# Patient Record
Sex: MALE | Race: BLACK OR AFRICAN AMERICAN | NOT HISPANIC OR LATINO | Employment: OTHER | URBAN - METROPOLITAN AREA
[De-identification: names, ages, dates, MRNs, and addresses within clinical notes are randomized per-mention and may not be internally consistent; named-entity substitution may affect disease eponyms.]

---

## 2023-08-06 ENCOUNTER — HOSPITAL ENCOUNTER (OUTPATIENT)
Facility: HOSPITAL | Age: 73
Discharge: HOME | End: 2023-08-07
Attending: EMERGENCY MEDICINE | Admitting: INTERNAL MEDICINE
Payer: MEDICARE

## 2023-08-06 DIAGNOSIS — I26.99 ACUTE PULMONARY EMBOLISM WITHOUT ACUTE COR PULMONALE, UNSPECIFIED PULMONARY EMBOLISM TYPE: Primary | ICD-10-CM

## 2023-08-06 DIAGNOSIS — R07.9 CHEST PAIN: ICD-10-CM

## 2023-08-06 PROBLEM — U07.1 COVID-19 VIRUS INFECTION: Status: ACTIVE | Noted: 2023-08-06

## 2023-08-06 LAB
ALBUMIN SERPL BCP-MCNC: 3.5 G/DL (ref 3.5–5.2)
ALP SERPL-CCNC: 35 U/L (ref 55–135)
ALT SERPL W/O P-5'-P-CCNC: 26 U/L (ref 10–44)
ANION GAP SERPL CALC-SCNC: 11 MMOL/L (ref 8–16)
AST SERPL-CCNC: 23 U/L (ref 10–40)
BASOPHILS # BLD AUTO: 0.02 K/UL (ref 0–0.2)
BASOPHILS NFR BLD: 0.5 % (ref 0–1.9)
BILIRUB SERPL-MCNC: 1 MG/DL (ref 0.1–1)
BNP SERPL-MCNC: 6 PG/ML (ref 0–99)
BUN SERPL-MCNC: <5 MG/DL (ref 8–23)
CALCIUM SERPL-MCNC: 8.6 MG/DL (ref 8.7–10.5)
CHLORIDE SERPL-SCNC: 100 MMOL/L (ref 95–110)
CO2 SERPL-SCNC: 24 MMOL/L (ref 23–29)
CREAT SERPL-MCNC: 0.8 MG/DL (ref 0.5–1.4)
D DIMER PPP IA.FEU-MCNC: 2.05 MG/L FEU
DIFFERENTIAL METHOD: ABNORMAL
EOSINOPHIL # BLD AUTO: 0.1 K/UL (ref 0–0.5)
EOSINOPHIL NFR BLD: 2.1 % (ref 0–8)
ERYTHROCYTE [DISTWIDTH] IN BLOOD BY AUTOMATED COUNT: 11.9 % (ref 11.5–14.5)
EST. GFR  (NO RACE VARIABLE): >60 ML/MIN/1.73 M^2
GLUCOSE SERPL-MCNC: 105 MG/DL (ref 70–110)
HCT VFR BLD AUTO: 35 % (ref 40–54)
HGB BLD-MCNC: 12.4 G/DL (ref 14–18)
IMM GRANULOCYTES # BLD AUTO: 0.02 K/UL (ref 0–0.04)
IMM GRANULOCYTES NFR BLD AUTO: 0.5 % (ref 0–0.5)
LYMPHOCYTES # BLD AUTO: 1.1 K/UL (ref 1–4.8)
LYMPHOCYTES NFR BLD: 28 % (ref 18–48)
MAGNESIUM SERPL-MCNC: 1.6 MG/DL (ref 1.6–2.6)
MCH RBC QN AUTO: 30 PG (ref 27–31)
MCHC RBC AUTO-ENTMCNC: 35.4 G/DL (ref 32–36)
MCV RBC AUTO: 85 FL (ref 82–98)
MONOCYTES # BLD AUTO: 0.4 K/UL (ref 0.3–1)
MONOCYTES NFR BLD: 9.5 % (ref 4–15)
NEUTROPHILS # BLD AUTO: 2.3 K/UL (ref 1.8–7.7)
NEUTROPHILS NFR BLD: 59.4 % (ref 38–73)
NRBC BLD-RTO: 0 /100 WBC
PLATELET # BLD AUTO: 321 K/UL (ref 150–450)
PMV BLD AUTO: 10.3 FL (ref 9.2–12.9)
POTASSIUM SERPL-SCNC: 3.3 MMOL/L (ref 3.5–5.1)
PROT SERPL-MCNC: 6.6 G/DL (ref 6–8.4)
RBC # BLD AUTO: 4.13 M/UL (ref 4.6–6.2)
SARS-COV-2 RDRP RESP QL NAA+PROBE: POSITIVE
SODIUM SERPL-SCNC: 135 MMOL/L (ref 136–145)
TROPONIN I SERPL HS-MCNC: 14.1 PG/ML (ref 0–14.9)
TROPONIN I SERPL HS-MCNC: 14.4 PG/ML (ref 0–14.9)
WBC # BLD AUTO: 3.89 K/UL (ref 3.9–12.7)

## 2023-08-06 PROCEDURE — G0378 HOSPITAL OBSERVATION PER HR: HCPCS

## 2023-08-06 PROCEDURE — 93005 ELECTROCARDIOGRAM TRACING: CPT | Performed by: GENERAL PRACTICE

## 2023-08-06 PROCEDURE — 85379 FIBRIN DEGRADATION QUANT: CPT | Performed by: EMERGENCY MEDICINE

## 2023-08-06 PROCEDURE — 83735 ASSAY OF MAGNESIUM: CPT | Performed by: EMERGENCY MEDICINE

## 2023-08-06 PROCEDURE — 83880 ASSAY OF NATRIURETIC PEPTIDE: CPT | Performed by: EMERGENCY MEDICINE

## 2023-08-06 PROCEDURE — 63600175 PHARM REV CODE 636 W HCPCS: Performed by: INTERNAL MEDICINE

## 2023-08-06 PROCEDURE — 96372 THER/PROPH/DIAG INJ SC/IM: CPT | Performed by: INTERNAL MEDICINE

## 2023-08-06 PROCEDURE — 93010 EKG 12-LEAD: ICD-10-PCS | Mod: ,,, | Performed by: GENERAL PRACTICE

## 2023-08-06 PROCEDURE — 25000003 PHARM REV CODE 250: Performed by: EMERGENCY MEDICINE

## 2023-08-06 PROCEDURE — 93010 ELECTROCARDIOGRAM REPORT: CPT | Mod: ,,, | Performed by: GENERAL PRACTICE

## 2023-08-06 PROCEDURE — 85025 COMPLETE CBC W/AUTO DIFF WBC: CPT | Performed by: EMERGENCY MEDICINE

## 2023-08-06 PROCEDURE — 99291 CRITICAL CARE FIRST HOUR: CPT | Mod: 25

## 2023-08-06 PROCEDURE — 80053 COMPREHEN METABOLIC PANEL: CPT | Performed by: EMERGENCY MEDICINE

## 2023-08-06 PROCEDURE — U0002 COVID-19 LAB TEST NON-CDC: HCPCS | Performed by: INTERNAL MEDICINE

## 2023-08-06 PROCEDURE — 84484 ASSAY OF TROPONIN QUANT: CPT | Mod: 91 | Performed by: EMERGENCY MEDICINE

## 2023-08-06 PROCEDURE — 63600175 PHARM REV CODE 636 W HCPCS: Performed by: EMERGENCY MEDICINE

## 2023-08-06 PROCEDURE — 25000003 PHARM REV CODE 250: Performed by: INTERNAL MEDICINE

## 2023-08-06 PROCEDURE — 25500020 PHARM REV CODE 255: Performed by: EMERGENCY MEDICINE

## 2023-08-06 PROCEDURE — 96372 THER/PROPH/DIAG INJ SC/IM: CPT | Performed by: EMERGENCY MEDICINE

## 2023-08-06 RX ORDER — LISINOPRIL AND HYDROCHLOROTHIAZIDE 20; 25 MG/1; MG/1
1 TABLET ORAL DAILY
COMMUNITY

## 2023-08-06 RX ORDER — CALCIUM CARBONATE 600 MG
TABLET ORAL ONCE
COMMUNITY

## 2023-08-06 RX ORDER — CETIRIZINE HYDROCHLORIDE 10 MG/1
10 TABLET ORAL DAILY
COMMUNITY

## 2023-08-06 RX ORDER — ATORVASTATIN CALCIUM 40 MG/1
40 TABLET, FILM COATED ORAL DAILY
Status: DISCONTINUED | OUTPATIENT
Start: 2023-08-06 | End: 2023-08-07

## 2023-08-06 RX ORDER — MULTIVITAMIN
1 TABLET ORAL DAILY
COMMUNITY

## 2023-08-06 RX ORDER — NITROGLYCERIN 0.4 MG/1
0.4 TABLET SUBLINGUAL EVERY 5 MIN PRN
Status: DISCONTINUED | OUTPATIENT
Start: 2023-08-06 | End: 2023-08-07 | Stop reason: HOSPADM

## 2023-08-06 RX ORDER — ATORVASTATIN CALCIUM 40 MG/1
40 TABLET, FILM COATED ORAL DAILY
COMMUNITY

## 2023-08-06 RX ORDER — NIFEDIPINE 30 MG/1
30 TABLET, EXTENDED RELEASE ORAL DAILY
Status: DISCONTINUED | OUTPATIENT
Start: 2023-08-06 | End: 2023-08-07

## 2023-08-06 RX ORDER — ACETAMINOPHEN 325 MG/1
650 TABLET ORAL EVERY 4 HOURS PRN
Status: DISCONTINUED | OUTPATIENT
Start: 2023-08-06 | End: 2023-08-07 | Stop reason: HOSPADM

## 2023-08-06 RX ORDER — ENOXAPARIN SODIUM 100 MG/ML
1 INJECTION SUBCUTANEOUS
Status: DISCONTINUED | OUTPATIENT
Start: 2023-08-06 | End: 2023-08-07 | Stop reason: HOSPADM

## 2023-08-06 RX ORDER — NIFEDIPINE 30 MG/1
30 TABLET, FILM COATED, EXTENDED RELEASE ORAL DAILY
COMMUNITY

## 2023-08-06 RX ORDER — LEVOTHYROXINE SODIUM 25 UG/1
25 TABLET ORAL
COMMUNITY

## 2023-08-06 RX ORDER — PREDNISONE 20 MG/1
20 TABLET ORAL DAILY
COMMUNITY

## 2023-08-06 RX ORDER — ENOXAPARIN SODIUM 100 MG/ML
1 INJECTION SUBCUTANEOUS
Status: COMPLETED | OUTPATIENT
Start: 2023-08-06 | End: 2023-08-06

## 2023-08-06 RX ORDER — POTASSIUM CHLORIDE 20 MEQ/1
40 TABLET, EXTENDED RELEASE ORAL ONCE
Status: COMPLETED | OUTPATIENT
Start: 2023-08-06 | End: 2023-08-06

## 2023-08-06 RX ADMIN — ATORVASTATIN CALCIUM 40 MG: 40 TABLET, FILM COATED ORAL at 09:08

## 2023-08-06 RX ADMIN — ENOXAPARIN SODIUM 80 MG: 80 INJECTION SUBCUTANEOUS at 12:08

## 2023-08-06 RX ADMIN — ENOXAPARIN SODIUM 80 MG: 80 INJECTION SUBCUTANEOUS at 09:08

## 2023-08-06 RX ADMIN — NITROGLYCERIN 1 INCH: 20 OINTMENT TOPICAL at 09:08

## 2023-08-06 RX ADMIN — IOHEXOL 100 ML: 350 INJECTION, SOLUTION INTRAVENOUS at 11:08

## 2023-08-06 RX ADMIN — POTASSIUM CHLORIDE 40 MEQ: 1500 TABLET, EXTENDED RELEASE ORAL at 12:08

## 2023-08-06 RX ADMIN — NIFEDIPINE 30 MG: 30 TABLET, FILM COATED, EXTENDED RELEASE ORAL at 09:08

## 2023-08-06 NOTE — PLAN OF CARE
08/06/23 1554   KRAUS Message   Medicare Outpatient and Observation Notification regarding financial responsibility Given to patient/caregiver;Explained to patient/caregiver;Signed/date by patient/caregiver   Date KRAUS was signed 08/06/23   Time KRAUS was signed 1534

## 2023-08-06 NOTE — ED PROVIDER NOTES
Encounter Date: 8/6/2023       History     Chief Complaint   Patient presents with    Chest Pain     Described as heaviness, 1 Nitro SL and 324 Asprin per EMS PTA.      73-year-old male with past medical history of hypertension, recent COVID diagnosis 1 week ago, presents emergency department with chest pain.  He says it started around 7:00 a.m..  Described as a heaviness in his left chest.  Says that he also had some tingling in his left hand.  He says that he took 324 mg of aspirin prior to calling EMS.  EMS roving gave him 1 sublingual nitro which mostly relieved his pain.  Currently he says that he is still having some pain around 5/10, some mild tingling in his left hand.  He says that it did not make him sweaty.  He did not vomit.  He did have some associated shortness of breath earlier today.  He has not had any recent stress test.  He is currently on antibiotics and he says he was scheduled to start steroids today.  Has not taken any steroids yet.      Review of patient's allergies indicates:  No Known Allergies  No past medical history on file.  No past surgical history on file.  No family history on file.     Review of Systems   Constitutional:  Negative for chills and fever.   HENT:  Negative for congestion and sore throat.    Respiratory:  Negative for shortness of breath.    Cardiovascular:  Positive for chest pain.   Gastrointestinal:  Negative for abdominal pain, nausea and vomiting.   Genitourinary:  Negative for dysuria.   Musculoskeletal:  Negative for back pain.   Skin:  Negative for rash.   Neurological:  Negative for weakness and headaches.   Hematological:  Does not bruise/bleed easily.   All other systems reviewed and are negative.      Physical Exam     Initial Vitals [08/06/23 0913]   BP Pulse Resp Temp SpO2   114/63 67 18 98.3 °F (36.8 °C) 99 %      MAP       --         Physical Exam    Nursing note and vitals reviewed.  Constitutional: He appears well-developed and well-nourished. He is  not diaphoretic. No distress.   HENT:   Head: Normocephalic and atraumatic.   Mouth/Throat: Oropharynx is clear and moist. No oropharyngeal exudate.   Eyes: Conjunctivae and EOM are normal. Pupils are equal, round, and reactive to light.   Neck: Neck supple. No tracheal deviation present.   Cardiovascular:  Normal rate, regular rhythm, normal heart sounds and intact distal pulses.           No murmur heard.  Pulmonary/Chest: Breath sounds normal. No stridor. No respiratory distress. He has no wheezes. He has no rhonchi. He has no rales.   Abdominal: Abdomen is soft. Bowel sounds are normal. He exhibits no distension. There is no abdominal tenderness. There is no rebound and no guarding.   Musculoskeletal:         General: No tenderness or edema. Normal range of motion.      Cervical back: Neck supple.     Neurological: He is alert and oriented to person, place, and time. He has normal strength. No cranial nerve deficit or sensory deficit. GCS score is 15. GCS eye subscore is 4. GCS verbal subscore is 5. GCS motor subscore is 6.   Skin: Skin is warm and dry. Capillary refill takes less than 2 seconds. No rash noted. No erythema. No pallor.   Psychiatric: He has a normal mood and affect. His behavior is normal. Thought content normal.         ED Course   Procedures  Labs Reviewed   CBC W/ AUTO DIFFERENTIAL - Abnormal; Notable for the following components:       Result Value    WBC 3.89 (*)     RBC 4.13 (*)     Hemoglobin 12.4 (*)     Hematocrit 35.0 (*)     All other components within normal limits   COMPREHENSIVE METABOLIC PANEL - Abnormal; Notable for the following components:    Sodium 135 (*)     Potassium 3.3 (*)     BUN <5 (*)     Calcium 8.6 (*)     Alkaline Phosphatase 35 (*)     All other components within normal limits   D DIMER, QUANTITATIVE - Abnormal; Notable for the following components:    D-Dimer 2.05 (*)     All other components within normal limits    Narrative:     ddimr critical result(s) repeated.  Called and verbal readback   obtained from Pam Epstein RN by LS1 08/06/2023 10:21   MAGNESIUM   TROPONIN I HIGH SENSITIVITY   B-TYPE NATRIURETIC PEPTIDE   TROPONIN I HIGH SENSITIVITY        ECG Results              EKG 12-lead (In process)  Result time 08/06/23 12:22:16      In process by Interface, Lab In [facility] (08/06/23 12:22:16)                   Narrative:    Test Reason : R07.9,    Vent. Rate : 062 BPM     Atrial Rate : 062 BPM     P-R Int : 142 ms          QRS Dur : 082 ms      QT Int : 412 ms       P-R-T Axes : 045 034 010 degrees     QTc Int : 418 ms    Normal sinus rhythm  Cannot rule out Anterior infarct ,age undetermined  Abnormal ECG  No previous ECGs available    Referred By: [email or web address]   SELF           Confirmed By:                                   Results for orders placed or performed during the hospital encounter of 08/06/23   Magnesium   Result Value Ref Range    Magnesium 1.6 1.6 - 2.6 mg/dL   CBC auto differential   Result Value Ref Range    WBC 3.89 (L) 3.90 - 12.70 K/uL    RBC 4.13 (L) 4.60 - 6.20 M/uL    Hemoglobin 12.4 (L) 14.0 - 18.0 g/dL    Hematocrit 35.0 (L) 40.0 - 54.0 %    MCV 85 82 - 98 fL    MCH 30.0 27.0 - 31.0 pg    MCHC 35.4 32.0 - 36.0 g/dL    RDW 11.9 11.5 - 14.5 %    Platelets 321 150 - 450 K/uL    MPV 10.3 9.2 - 12.9 fL    Immature Granulocytes 0.5 0.0 - 0.5 %    Gran # (ANC) 2.3 1.8 - 7.7 K/uL    Immature Grans (Abs) 0.02 0.00 - 0.04 K/uL    Lymph # 1.1 1.0 - 4.8 K/uL    Mono # 0.4 0.3 - 1.0 K/uL    Eos # 0.1 0.0 - 0.5 K/uL    Baso # 0.02 0.00 - 0.20 K/uL    nRBC 0 0 /100 WBC    Gran % 59.4 38.0 - 73.0 %    Lymph % 28.0 18.0 - 48.0 %    Mono % 9.5 4.0 - 15.0 %    Eosinophil % 2.1 0.0 - 8.0 %    Basophil % 0.5 0.0 - 1.9 %    Differential Method Automated    Comprehensive metabolic panel   Result Value Ref Range    Sodium 135 (L) 136 - 145 mmol/L    Potassium 3.3 (L) 3.5 - 5.1 mmol/L    Chloride 100 95 - 110 mmol/L    CO2 24 23 - 29 mmol/L    Glucose 105 70 - 110 mg/dL    BUN  <5 (L) 8 - 23 mg/dL    Creatinine 0.8 0.5 - 1.4 mg/dL    Calcium 8.6 (L) 8.7 - 10.5 mg/dL    Total Protein 6.6 6.0 - 8.4 g/dL    Albumin 3.5 3.5 - 5.2 g/dL    Total Bilirubin 1.0 0.1 - 1.0 mg/dL    Alkaline Phosphatase 35 (L) 55 - 135 U/L    AST 23 10 - 40 U/L    ALT 26 10 - 44 U/L    eGFR >60.0 >60 mL/min/1.73 m^2    Anion Gap 11 8 - 16 mmol/L   Troponin I High Sensitivity #1   Result Value Ref Range    Troponin I High Sensitivity 14.1 0.0 - 14.9 pg/mL   B-Type natriuretic peptide (BNP)   Result Value Ref Range    BNP 6 0 - 99 pg/mL   D dimer, quantitative   Result Value Ref Range    D-Dimer 2.05 (HH) <0.50 mg/L FEU       Imaging Results              CTA Chest Non-Coronary (PE Studies) (Final result)  Result time 08/06/23 11:49:19      Final result by Edgar Rodriguez MD (08/06/23 11:49:19)                   Narrative:    CMS MANDATED QUALITY DATA - CT RADIATION  436    All CT scans at this facility utilize dose modulation, iterative reconstruction, and/or weight based dosing when appropriate to reduce radiation dose to as low as reasonably achievable.    CLINICAL HISTORY:  73 years (1950) Male Pulmonary embolism (PE) suspected, positive D-dimer    TECHNIQUE:  CT CHEST ANGIOGRAPHY WITH IV CONTRAST.  Axial CT examination of the chest with attention to the pulmonary arteries was performed using contiguous axial images from the diaphragms to the lung apices following the intravenous administration of non-ionic contrast material. Images were reviewed using lung, mediastinal, and bone windows. The study was performed with thin sections with subsequent 3-D reformats/MIP/reconstructions in multiple planes.    COMPARISON:  Radiograph from the same day.    FINDINGS:  CTA PE evaluation: This is a moderate quality study for the evaluation of pulmonary embolism. Obtained said there is a moderate volume of acute pulmonary thromboembolus in the right greater than left lung. There is a tiny thrombus in the left upper lobe  (image 123), small thrombus in the right upper lobe (image 122), small thrombus in the right main pulmonary artery (image 158), moderate thrombus in the right lower lobe (image 175, 190, 202), tiny thrombus in the right middle lobe (image 170), and small thrombus in the left lower lobe (image 202). No flattening of the interventricular septum or hepatic aorta. Contrast is seen to suggest right heart strain/failure.    CT Chest:  Visualized neck: Within normal limits.  Lungs: There is dependent atelectasis in the bilateral lower lobes.  The lungs are otherwise clear.  Airway: The trachea and central bronchial tree appear normal.  Pleura: There is no pleural effusion. There is no pneumothorax.  Cardiovascular: The heart is normal in size. There are no findings of right heart failure. The pulmonary artery is normal in size. There is no pericardial effusion. The thoracic aorta is normal in course and caliber.  There are atherosclerotic calcifications of the coronary arteries.  Mediastinum: There is no supraclavicular, axillary, mediastinal, or hilar lymphadenopathy.  Soft tissues: The peripheral soft tissues appear normal.  Musculoskeletal: There are mild degenerative changes of the thoracic spine, with an ACDF in the lower cervical spine.  There are no suspicious osseous lesions.  Esophagus: The esophagus appears grossly normal.  Upper Abdomen: No acute abnormality in the upper abdomen. There are simple cysts in the upper pole of both kidneys.    IMPRESSION:  1. Moderate volume of acute pulmonary thrombus embolus in the right greater than left lung.  2. No finding of right heart strain/failure.  3. Additional and incidental findings as above.                  RESULT NOTIFICATION: These observations were called to the ordering provider LATONYA WATSON at 8/6/2023 11:44 AM CDT, with no response. Dawit TOLENTINO, ER bed 17, acknowledged receipt of the findings by phone, to be communicated to the ordering provider.    CMS MANDATED  QUALITY DATA- INCIDENTAL ABDOMINAL RENAL - 405    INCIDENTAL RENAL LESION:  CONTRAST CT  TSTC, Homogeneous  Likely benign cyst not fully characterized. No further workup.  Inconclusive based on subjective evaluation = Indeterminate  Homogeneous: thin or imperceptible wall, no mural nodule septa or calcification  -10 to 20 HU Likely benign cyst. No further workup.  >20 HU Solid or complicated cystic mass. Indeterminate.  Heterogeneous: Thick or irregular wall, mural nodule, septal or calcification. Indeterminate.    TSTC = too small to characterize = less than twice the slice thickness.    RECOMMENDATION:  No further imaging is recommended as incidental lesions are likely benign.    Recommendations based on:  Management of the Incidental Renal Mass on CT: A White Paper of the ACR ICF  J Am Jimena Radiol 2018;15:264-273. 2017 American College of Radiology      Electronically signed by:  Edgar Rodriguez MD  8/6/2023 11:49 AM CDT Workstation: [id number]                                     X-Ray Chest AP Portable (Final result)  Result time 08/06/23 09:50:48      Final result by Edgar Rodriguez MD (08/06/23 09:50:48)                   Narrative:    CLINICAL HISTORY:  73 years (1950) Male Chest Pain Chest Pain    TECHNIQUE:  Portable AP radiograph the chest. One view.    COMPARISON:  None available.    FINDINGS:  The lungs are clear. Costophrenic angles are seen without effusion. No pneumothorax is identified. The heart is normal in size. The mediastinum is within normal limits. Osseous structures show degenerative changes in the spine. The visualized upper abdomen is unremarkable.    IMPRESSION:  No acute cardiac or pulmonary process.                  .            Electronically signed by:  Edgar Rodriguez MD  8/6/2023 9:50 AM CDT Workstation: [id number]                                     Medications   nitroGLYCERIN SL tablet 0.4 mg (has no administration in time range)   atorvastatin tablet 40 mg (has no  administration in time range)   NIFEdipine 24 hr tablet 30 mg (has no administration in time range)   acetaminophen tablet 650 mg (has no administration in time range)   enoxaparin injection 80 mg (has no administration in time range)   nitroGLYCERIN 2% TD oint ointment 1 inch (1 inch Topical (Top) Given 8/6/23 1352)   iohexoL (OMNIPAQUE 350) injection 100 mL (100 mLs Intravenous Given 8/6/23 1119)   enoxaparin injection 80 mg (80 mg Subcutaneous Given 8/6/23 1232)   potassium chloride SA CR tablet 40 mEq (40 mEq Oral Given 8/6/23 1232)     Medical Decision Making:   Differential Diagnosis:   Includes but not limited to PE, pneumonia, acute coronary syndrome, dissection, pneumothorax,  Clinical Tests:   Lab Tests: Ordered and Reviewed  Radiological Study: Ordered and Reviewed  Medical Tests: Ordered and Reviewed  ED Management:  Emergent evaluation of a 73-year-old male presents emergency department with chest pain and heaviness as described above.  Patient in the emergency department with recent diagnosis of COVID 1 week ago.  PE considered as a possibility, D-dimer sent and was elevated.  Patient had CTA of the chest which demonstrates bilateral pulmonary emboli.  Patient does not have any evidence of any right heart strain.  Patient has been given Lovenox here in the emergency department.  Case was discussed with the hospitalist will admit him to the hospital.  Patient hemodynamically stable, not hypotensive.  Patient be admitted for further care and treatment and evaluation of his chest pain/acute PE.            Attending Attestation:             Attending ED Notes:   Attending Critical Care:   Critical Care Times:   Direct Patient Care (initial evaluation, reassessments, and time considering the case)................................................................10 minutes.   Additional History from reviewing old medical records or taking additional history from the family, EMS, PCP,  etc.......................10 minutes.   Ordering, Reviewing, and Interpreting Diagnostic Studies...............................................................................................................10 minutes.   Documentation..................................................................................................................................................................................5 minutes.   ==============================================================  · Total Critical Care Time - exclusive of procedural time: 35 minutes.  ==============================================================  Critical care was necessary to treat or prevent imminent or life-threatening deterioration of the following conditions:  Pulmonary embolus.   Critical care was time spent personally by me on the following activities: obtaining history from patient or relative, examination of patient, review of x-rays / CT sent with the patient, ordering lab, x-rays, and/or EKG, development of treatment plan with patient or relative, ordering and performing treatments and interventions, interpretation of cardiac measurements and re-evaluation of patient's conition.   Critical Care Condition: potentially life-threatening         ED Course as of 08/06/23 1308   Sun Aug 06, 2023   0947 EKG 9:16 a.m. normal sinus rhythm rate of 62.  No ST elevation or depression.  No STEMI.  EKG interpreted independently by me.   [JR]      ED Course User Index  [JR] Mohit Ahuja DO                 Clinical Impression:   Final diagnoses:  [R07.9] Chest pain  [I26.99] Acute pulmonary embolism without acute cor pulmonale, unspecified pulmonary embolism type (Primary)        ED Disposition Condition    Observation                 Mohit Ahuja DO  08/06/23 1305

## 2023-08-06 NOTE — PHARMACY MED REC
"              .    Admission Medication History     The home medication history was taken by May Godinez.    You may go to "Admission" then "Reconcile Home Medications" tabs to review and/or act upon these items.     The home medication list has been updated by the Pharmacy department.   Please read ALL comments highlighted in yellow.   Please address this information as you see fit.    Feel free to contact us if you have any questions or require assistance.    Medications listed below were obtained from: Patient/family  No current facility-administered medications on file prior to encounter.     Current Outpatient Medications on File Prior to Encounter   Medication Sig Dispense Refill    atorvastatin (LIPITOR) 40 MG tablet Take 40 mg by mouth once daily.      CALCIUM CITRATE ORAL Take 1 tablet by mouth once daily.      cetirizine (ZYRTEC) 10 MG tablet Take 10 mg by mouth once daily.      levothyroxine (SYNTHROID) 25 MCG tablet Take 25 mcg by mouth before breakfast.      lisinopriL-hydrochlorothiazide (PRINZIDE,ZESTORETIC) 20-25 mg Tab Take 1 tablet by mouth once daily.      multivitamin (ONE DAILY MULTIVITAMIN) per tablet Take 1 tablet by mouth once daily.      NIFEdipine (ADALAT CC) 30 MG TbSR Take 30 mg by mouth once daily.      calcium carbonate-vit D3-min (CALCIUM 600 + MINERALS) 600 mg calcium- 200 unit Tab Take by mouth once.      predniSONE (DELTASONE) 20 MG tablet Take 20 mg by mouth once daily.             May Godinez  [id number]        "

## 2023-08-06 NOTE — PLAN OF CARE
[facility]  Initial Discharge Assessment       Primary Care Provider: No, Primary Doctor    Admission Diagnosis: Acute pulmonary embolism without acute cor pulmonale, unspecified pulmonary embolism type [I26.99]    Admission Date: 8/6/2023  Expected Discharge Date: TBD  Met with patient at bedside to complete assessment. Information on face sheet was verified. [facility] No [facility], HD, Coumadin, or DME. Patient's wife will bring him home when he is discarged. No identified needs at this time.    Transition of Care Barriers: None    Payor: MEDICARE / Plan: MEDICARE PART A & B / Product Type: Government /     Extended Emergency Contact Information  Primary Emergency Contact: VincentMargie  Mobile Phone: [phone]  Relation: Spouse    Discharge Plan A: Home with family  Discharge Plan B: Home with family    No Pharmacies Listed    Initial Assessment (most recent)       Adult Discharge Assessment - 08/06/23 1549          Discharge Assessment    Assessment Type Discharge Planning Assessment     Confirmed/corrected address, phone number and insurance Yes     Confirmed Demographics Correct on Facesheet     Source of Information patient     When was your last doctors appointment? --   3-4 months ago. Needs PCP    Does patient/caregiver understand observation status Yes     Communicated NIRMAL with patient/caregiver Date not available/Unable to determine     Reason For Admission acute pulmonary embolism     People in Home spouse     Facility Arrived From: home     Do you expect to return to your current living situation? Yes     Do you have help at home or someone to help you manage your care at home? Yes     Who are your caregiver(s) and their phone number(s)? Margie Kaur/wife [phone]     Prior to hospitilization cognitive status: Alert/Oriented;No Deficits     Current cognitive status: Alert/Oriented;No Deficits     Equipment Currently Used at Home none     Readmission within 30 days? No      Patient currently being followed by outpatient case management? No     Do you currently have service(s) that help you manage your care at home? No     Do you take prescription medications? Yes     Do you have prescription coverage? Yes     Coverage [employer]     Do you have any problems affording any of your prescribed medications? No     Who is going to help you get home at discharge? Margie Kaur/wife [phone]     How do you get to doctors appointments? car, drives self     Are you on dialysis? No     Do you take coumadin? No     Discharge Plan A Home with family     Discharge Plan B Home with family     DME Needed Upon Discharge  none     Discharge Plan discussed with: Patient     Transition of Care Barriers None        OTHER    Name(s) of People in Home Margie Kaur/wife [phone]

## 2023-08-07 VITALS
DIASTOLIC BLOOD PRESSURE: 62 MMHG | BODY MASS INDEX: 23.77 KG/M2 | HEART RATE: 64 BPM | RESPIRATION RATE: 18 BRPM | HEIGHT: 72 IN | SYSTOLIC BLOOD PRESSURE: 114 MMHG | TEMPERATURE: 98 F | OXYGEN SATURATION: 98 % | WEIGHT: 175.5 LBS

## 2023-08-07 DIAGNOSIS — U07.1 COVID-19 VIRUS DETECTED: ICD-10-CM

## 2023-08-07 LAB
ALBUMIN SERPL BCP-MCNC: 3.7 G/DL (ref 3.5–5.2)
ALP SERPL-CCNC: 39 U/L (ref 55–135)
ALT SERPL W/O P-5'-P-CCNC: 25 U/L (ref 10–44)
ANION GAP SERPL CALC-SCNC: 7 MMOL/L (ref 8–16)
AST SERPL-CCNC: 17 U/L (ref 10–40)
BILIRUB SERPL-MCNC: 0.7 MG/DL (ref 0.1–1)
BUN SERPL-MCNC: 5 MG/DL (ref 8–23)
CALCIUM SERPL-MCNC: 8.9 MG/DL (ref 8.7–10.5)
CHLORIDE SERPL-SCNC: 104 MMOL/L (ref 95–110)
CO2 SERPL-SCNC: 27 MMOL/L (ref 23–29)
CREAT SERPL-MCNC: 0.8 MG/DL (ref 0.5–1.4)
ERYTHROCYTE [DISTWIDTH] IN BLOOD BY AUTOMATED COUNT: 12 % (ref 11.5–14.5)
EST. GFR  (NO RACE VARIABLE): >60 ML/MIN/1.73 M^2
GLUCOSE SERPL-MCNC: 107 MG/DL (ref 70–110)
HCT VFR BLD AUTO: 36.1 % (ref 40–54)
HGB BLD-MCNC: 12.6 G/DL (ref 14–18)
MCH RBC QN AUTO: 30.1 PG (ref 27–31)
MCHC RBC AUTO-ENTMCNC: 34.9 G/DL (ref 32–36)
MCV RBC AUTO: 86 FL (ref 82–98)
PLATELET # BLD AUTO: 347 K/UL (ref 150–450)
PMV BLD AUTO: 10.2 FL (ref 9.2–12.9)
POTASSIUM SERPL-SCNC: 3.8 MMOL/L (ref 3.5–5.1)
PROT SERPL-MCNC: 6.4 G/DL (ref 6–8.4)
RBC # BLD AUTO: 4.18 M/UL (ref 4.6–6.2)
SODIUM SERPL-SCNC: 138 MMOL/L (ref 136–145)
WBC # BLD AUTO: 4.68 K/UL (ref 3.9–12.7)

## 2023-08-07 PROCEDURE — 85027 COMPLETE CBC AUTOMATED: CPT | Performed by: INTERNAL MEDICINE

## 2023-08-07 PROCEDURE — 36415 COLL VENOUS BLD VENIPUNCTURE: CPT | Performed by: INTERNAL MEDICINE

## 2023-08-07 PROCEDURE — 96372 THER/PROPH/DIAG INJ SC/IM: CPT | Performed by: INTERNAL MEDICINE

## 2023-08-07 PROCEDURE — 80053 COMPREHEN METABOLIC PANEL: CPT | Performed by: INTERNAL MEDICINE

## 2023-08-07 PROCEDURE — 63600175 PHARM REV CODE 636 W HCPCS: Performed by: INTERNAL MEDICINE

## 2023-08-07 PROCEDURE — G0378 HOSPITAL OBSERVATION PER HR: HCPCS

## 2023-08-07 RX ORDER — ATORVASTATIN CALCIUM 40 MG/1
40 TABLET, FILM COATED ORAL NIGHTLY
Status: DISCONTINUED | OUTPATIENT
Start: 2023-08-07 | End: 2023-08-07 | Stop reason: HOSPADM

## 2023-08-07 RX ORDER — NIFEDIPINE 30 MG/1
30 TABLET, EXTENDED RELEASE ORAL NIGHTLY
Status: DISCONTINUED | OUTPATIENT
Start: 2023-08-07 | End: 2023-08-07 | Stop reason: HOSPADM

## 2023-08-07 RX ADMIN — ENOXAPARIN SODIUM 80 MG: 80 INJECTION SUBCUTANEOUS at 08:08

## 2023-08-07 NOTE — ASSESSMENT & PLAN NOTE
Maintain SQ lovenox  Ordered USS veins LEs  If stable can go home tomorrow with NOAC and follow up in Hematology clinic

## 2023-08-07 NOTE — SUBJECTIVE & OBJECTIVE
Past Medical History:   Diagnosis Date    COVID     DDD (degenerative disc disease), lumbosacral     GERD (gastroesophageal reflux disease)        Past Surgical History:   Procedure Laterality Date    SKIN BIOPSY         Review of patient's allergies indicates:  No Known Allergies    No current facility-administered medications on file prior to encounter.     Current Outpatient Medications on File Prior to Encounter   Medication Sig    atorvastatin (LIPITOR) 40 MG tablet Take 40 mg by mouth once daily.    CALCIUM CITRATE ORAL Take 1 tablet by mouth once daily.    cetirizine (ZYRTEC) 10 MG tablet Take 10 mg by mouth once daily.    levothyroxine (SYNTHROID) 25 MCG tablet Take 25 mcg by mouth before breakfast.    lisinopriL-hydrochlorothiazide (PRINZIDE,ZESTORETIC) 20-25 mg Tab Take 1 tablet by mouth once daily.    multivitamin (ONE DAILY MULTIVITAMIN) per tablet Take 1 tablet by mouth once daily.    NIFEdipine (ADALAT CC) 30 MG TbSR Take 30 mg by mouth once daily.    calcium carbonate-vit D3-min (CALCIUM 600 + MINERALS) 600 mg calcium- 200 unit Tab Take by mouth once.    predniSONE (DELTASONE) 20 MG tablet Take 20 mg by mouth once daily.     Family History       Problem Relation (Age of Onset)    Hypertension Mother          Tobacco Use    Smoking status: Not on file    Smokeless tobacco: Not on file   Substance and Sexual Activity    Alcohol use: Not on file    Drug use: Not on file    Sexual activity: Not on file     Review of Systems   Constitutional:  Negative for activity change and appetite change.   HENT:  Negative for congestion and dental problem.    Eyes:  Negative for discharge and itching.   Respiratory:  Positive for chest tightness and shortness of breath.    Cardiovascular:  Negative for chest pain.   Gastrointestinal:  Negative for abdominal distention and abdominal pain.   Endocrine: Negative for cold intolerance.   Genitourinary:  Negative for difficulty urinating and dysuria.   Musculoskeletal:   Negative for arthralgias and back pain.   Skin:  Negative for color change.   Neurological:  Negative for dizziness and facial asymmetry.   Hematological:  Negative for adenopathy.   Psychiatric/Behavioral:  Negative for agitation and behavioral problems.      Objective:     Vital Signs (Most Recent):  Temp: 98.3 °F (36.8 °C) (08/06/23 2005)  Pulse: 62 (08/06/23 2005)  Resp: 18 (08/06/23 2005)  BP: 132/65 (08/06/23 2005)  SpO2: 97 % (08/06/23 2005) Vital Signs (24h Range):  Temp:  [96.4 °F (35.8 °C)-98.3 °F (36.8 °C)] 98.3 °F (36.8 °C)  Pulse:  [53-71] 62  Resp:  [17-24] 18  SpO2:  [96 %-99 %] 97 %  BP: (104-149)/(55-67) 132/65     Weight: 79.6 kg (175 lb 7.8 oz)  Body mass index is 23.8 kg/m².     Physical Exam  Vitals and nursing note reviewed.   Constitutional:       Appearance: He is well-developed.   HENT:      Head: Atraumatic.      Right Ear: External ear normal.      Left Ear: External ear normal.      Nose: Nose normal.      Mouth/Throat:      Mouth: Mucous membranes are moist.   Cardiovascular:      Rate and Rhythm: Normal rate.   Pulmonary:      Effort: Pulmonary effort is normal.   Musculoskeletal:         General: Normal range of motion.      Cervical back: Full passive range of motion without pain and normal range of motion.   Skin:     General: Skin is warm.   Neurological:      Mental Status: He is alert and oriented to person, place, and time.   Psychiatric:         Behavior: Behavior normal.                Significant Labs: All pertinent labs within the past 24 hours have been reviewed.  CBC:   Recent Labs   Lab 08/06/23  0955   WBC 3.89*   HGB 12.4*   HCT 35.0*        CMP:   Recent Labs   Lab 08/06/23  0955   *   K 3.3*      CO2 24      BUN <5*   CREATININE 0.8   CALCIUM 8.6*   PROT 6.6   ALBUMIN 3.5   BILITOT 1.0   ALKPHOS 35*   AST 23   ALT 26   ANIONGAP 11       Significant Imaging: I have reviewed all pertinent imaging results/findings within the past 24 hours.

## 2023-08-07 NOTE — H&P
[facility] Medicine  History & Physical    Patient Name: Rosemary Lee  MRN: [id number]  Patient Class: OP- Observation  Admission Date: 8/6/2023  Attending Physician: Krzyzstof Osuna MD   Primary Care Provider: [facility]         Patient information was obtained from patient and ER records.     Subjective:     Principal Problem:Acute pulmonary embolism    Chief Complaint:   Chief Complaint   Patient presents with    Chest Pain     Described as heaviness, 1 Nitro SL and 324 Asprin per EMS PTA.         HPI: 73 year old pt getting admitted with acute PE in the background of recent covid infection  Pt suffered from COVID infection last week  Today morning pt started having chest pain , more worse on L side with radiation to LUE, associated with mild dyspnoea  Pt took aspirin and called EMS and came to ER  Imaging studies showed acute PE and pt got admitted         Past Medical History:   Diagnosis Date    COVID     DDD (degenerative disc disease), lumbosacral     GERD (gastroesophageal reflux disease)        Past Surgical History:   Procedure Laterality Date    SKIN BIOPSY         Review of patient's allergies indicates:  No Known Allergies    No current facility-administered medications on file prior to encounter.     Current Outpatient Medications on File Prior to Encounter   Medication Sig    atorvastatin (LIPITOR) 40 MG tablet Take 40 mg by mouth once daily.    CALCIUM CITRATE ORAL Take 1 tablet by mouth once daily.    cetirizine (ZYRTEC) 10 MG tablet Take 10 mg by mouth once daily.    levothyroxine (SYNTHROID) 25 MCG tablet Take 25 mcg by mouth before breakfast.    lisinopriL-hydrochlorothiazide (PRINZIDE,ZESTORETIC) 20-25 mg Tab Take 1 tablet by mouth once daily.    multivitamin (ONE DAILY MULTIVITAMIN) per tablet Take 1 tablet by mouth once daily.    NIFEdipine (ADALAT CC) 30 MG TbSR Take 30 mg by mouth once daily.    calcium carbonate-vit D3-min (CALCIUM 600 +  MINERALS) 600 mg calcium- 200 unit Tab Take by mouth once.    predniSONE (DELTASONE) 20 MG tablet Take 20 mg by mouth once daily.     Family History       Problem Relation (Age of Onset)    Hypertension Mother          Tobacco Use    Smoking status: Not on file    Smokeless tobacco: Not on file   Substance and Sexual Activity    Alcohol use: Not on file    Drug use: Not on file    Sexual activity: Not on file     Review of Systems   Constitutional:  Negative for activity change and appetite change.   HENT:  Negative for congestion and dental problem.    Eyes:  Negative for discharge and itching.   Respiratory:  Positive for chest tightness and shortness of breath.    Cardiovascular:  Negative for chest pain.   Gastrointestinal:  Negative for abdominal distention and abdominal pain.   Endocrine: Negative for cold intolerance.   Genitourinary:  Negative for difficulty urinating and dysuria.   Musculoskeletal:  Negative for arthralgias and back pain.   Skin:  Negative for color change.   Neurological:  Negative for dizziness and facial asymmetry.   Hematological:  Negative for adenopathy.   Psychiatric/Behavioral:  Negative for agitation and behavioral problems.      Objective:     Vital Signs (Most Recent):  Temp: 98.3 °F (36.8 °C) (08/06/23 2005)  Pulse: 62 (08/06/23 2005)  Resp: 18 (08/06/23 2005)  BP: 132/65 (08/06/23 2005)  SpO2: 97 % (08/06/23 2005) Vital Signs (24h Range):  Temp:  [96.4 °F (35.8 °C)-98.3 °F (36.8 °C)] 98.3 °F (36.8 °C)  Pulse:  [53-71] 62  Resp:  [17-24] 18  SpO2:  [96 %-99 %] 97 %  BP: (104-149)/(55-67) 132/65     Weight: 79.6 kg (175 lb 7.8 oz)  Body mass index is 23.8 kg/m².     Physical Exam  Vitals and nursing note reviewed.   Constitutional:       Appearance: He is well-developed.   HENT:      Head: Atraumatic.      Right Ear: External ear normal.      Left Ear: External ear normal.      Nose: Nose normal.      Mouth/Throat:      Mouth: Mucous membranes are moist.   Cardiovascular:       Rate and Rhythm: Normal rate.   Pulmonary:      Effort: Pulmonary effort is normal.   Musculoskeletal:         General: Normal range of motion.      Cervical back: Full passive range of motion without pain and normal range of motion.   Skin:     General: Skin is warm.   Neurological:      Mental Status: He is alert and oriented to person, place, and time.   Psychiatric:         Behavior: Behavior normal.                Significant Labs: All pertinent labs within the past 24 hours have been reviewed.  CBC:   Recent Labs   Lab 08/06/23  0955   WBC 3.89*   HGB 12.4*   HCT 35.0*        CMP:   Recent Labs   Lab 08/06/23  0955   *   K 3.3*      CO2 24      BUN <5*   CREATININE 0.8   CALCIUM 8.6*   PROT 6.6   ALBUMIN 3.5   BILITOT 1.0   ALKPHOS 35*   AST 23   ALT 26   ANIONGAP 11       Significant Imaging: I have reviewed all pertinent imaging results/findings within the past 24 hours.    Assessment/Plan:     * Acute pulmonary embolism  Maintain SQ lovenox  Ordered USS veins LEs  If stable can go home tomorrow with NOAC and follow up in Hematology clinic      COVID-19 virus infection  Stable       VTE Risk Mitigation (From admission, onward)         Ordered     enoxaparin injection 80 mg  Every 12 hours (non-standard times)         08/06/23 1238     IP VTE HIGH RISK PATIENT  Once         08/06/23 1237     Place sequential compression device  Until discontinued         08/06/23 1237                   On 08/06/2023, patient should be placed in hospital observation services under my care.        Krzysztof Osuna MD  Department of Hospital Medicine  Critical access hospital

## 2023-08-07 NOTE — NURSING
Discharge instructions reviewed with pt.  IV removed without difficulty, catheter intact.  Pt left unit in stable condition via personal transportation.

## 2023-08-07 NOTE — DISCHARGE SUMMARY
[facility] Medicine  Discharge Summary      Patient Name: Rosemary Lee  MRN: [id number]  [facility]: [id number]  Patient Class: OP- Observation  Admission Date: 8/6/2023  Hospital Length of Stay: 0 days  Discharge Date and Time: 8/7/2023 12:06 PM  Attending Physician: No att. providers found   Discharging Provider: Yumi Cui MD  Primary Care Provider: Uriel Clark NP    Primary Care Team: Networked reference to record PCT     HPI:   73 year old pt getting admitted with acute PE in the background of recent covid infection  Pt suffered from COVID infection last week  Today morning pt started having chest pain , more worse on L side with radiation to LUE, associated with mild dyspnoea  Pt took aspirin and called EMS and came to ER  Imaging studies showed acute PE and pt got admitted         * No surgery found *      Hospital Course:   Patient was admitted 1 week after having COVID infection with acute PE.  CTA showed moderate bilateral pulmonary emboli worse on the right and no evidence of heart strain.  He was treated with therapeutic Lovenox.  Ultrasound of the lower extremities was negative for DVT.  He remained on room air through his stay.  He improved symptomatically and was discharged home in stable condition with oral Eliquis for an anticipated three-month course.       Goals of Care Treatment Preferences:  Code Status: Full Code      Consults:     No new Assessment & Plan notes have been filed under this hospital service since the last note was generated.  Service: Hospital Medicine    Final Active Diagnoses:    Diagnosis Date Noted POA    PRINCIPAL PROBLEM:  Acute pulmonary embolism [I26.99] 08/06/2023 Unknown    COVID-19 virus infection [U07.1] 08/06/2023 Unknown      Problems Resolved During this Admission:       Discharged Condition: good    Disposition: Home or Self Care    Follow Up:   Follow-up Information     Uriel Clark NP. Go on 8/10/2023.     Specialty: Family Medicine  Why: 2:20 PM, Hospital follow up  Contact information:  [address] [facility]  [address]  [facility] [id number] [phone]                       Patient Instructions:      Diet Adult Regular     Activity as tolerated       Significant Diagnostic Studies: N/A    Pending Diagnostic Studies:     None         Medications:  Reconciled Home Medications:      Medication List      START taking these medications    apixaban 5 mg Tab  Commonly known as: ELIQUIS  Take 1 tablet (5 mg total) by mouth 2 (two) times daily.        CONTINUE taking these medications    atorvastatin 40 MG tablet  Commonly known as: LIPITOR  Take 40 mg by mouth once daily.     CALCIUM 600 + MINERALS 600 mg calcium- 200 unit Tab  Generic drug: calcium carbonate-vit D3-min  Take by mouth once.     CALCIUM CITRATE ORAL  Take 1 tablet by mouth once daily.     cetirizine 10 MG tablet  Commonly known as: ZYRTEC  Take 10 mg by mouth once daily.     levothyroxine 25 MCG tablet  Commonly known as: SYNTHROID  Take 25 mcg by mouth before breakfast.     lisinopriL-hydrochlorothiazide 20-25 mg Tab  Commonly known as: PRINZIDE,ZESTORETIC  Take 1 tablet by mouth once daily.     NIFEdipine 30 MG Tbsr  Commonly known as: ADALAT CC  Take 30 mg by mouth once daily.     ONE DAILY MULTIVITAMIN per tablet  Generic drug: multivitamin  Take 1 tablet by mouth once daily.     predniSONE 20 MG tablet  Commonly known as: DELTASONE  Take 20 mg by mouth once daily.            Indwelling Lines/Drains at time of discharge:   Lines/Drains/Airways     None                 Time spent on the discharge of patient: 33 minutes         Yumi Cui MD  Department of Hospital Medicine  Critical access hospital

## 2023-08-07 NOTE — HPI
73 year old pt getting admitted with acute PE in the background of recent covid infection  Pt suffered from COVID infection last week  Today morning pt started having chest pain , more worse on L side with radiation to LUE, associated with mild dyspnoea  Pt took aspirin and called EMS and came to ER  Imaging studies showed acute PE and pt got admitted

## 2023-08-07 NOTE — HOSPITAL COURSE
Patient was admitted 1 week after having COVID infection with acute PE.  CTA showed moderate bilateral pulmonary emboli worse on the right and no evidence of heart strain.  He was treated with therapeutic Lovenox.  Ultrasound of the lower extremities was negative for DVT.  He remained on room air through his stay.  He improved symptomatically and was discharged home in stable condition with oral Eliquis for an anticipated three-month course.

## 2023-08-07 NOTE — PLAN OF CARE
08/07/23 1103   Final Note   Assessment Type Final Discharge Note   Anticipated Discharge Disposition Home   What phone number can be called within the next 1-3 days to see how you are doing after discharge? [phone]   Hospital Resources/Appts/Education Provided Appointments scheduled and added to AVS   Post-Acute Status   Discharge Delays None known at this time     Patient cleared for discharge from case management standpoint.    Follow up appointments scheduled and added to AVS.    Chart and discharge orders reviewed.  Patient discharged home with no further case management needs.

## 2023-08-09 ENCOUNTER — PATIENT MESSAGE (OUTPATIENT)
Dept: ADMINISTRATIVE | Facility: OTHER | Age: 73
End: 2023-08-09
Payer: MEDICARE

## 2023-09-12 ENCOUNTER — OFFICE VISIT (OUTPATIENT)
Dept: FAMILY MEDICINE | Facility: CLINIC | Age: 73
End: 2023-09-12
Payer: MEDICARE

## 2023-09-12 VITALS
BODY MASS INDEX: 23.84 KG/M2 | TEMPERATURE: 98 F | HEART RATE: 85 BPM | DIASTOLIC BLOOD PRESSURE: 88 MMHG | HEIGHT: 72 IN | OXYGEN SATURATION: 98 % | SYSTOLIC BLOOD PRESSURE: 118 MMHG | WEIGHT: 176 LBS

## 2023-09-12 DIAGNOSIS — C61 CARCINOMA OF PROSTATE: ICD-10-CM

## 2023-09-12 DIAGNOSIS — Z11.59 NEED FOR HEPATITIS C SCREENING TEST: ICD-10-CM

## 2023-09-12 DIAGNOSIS — I10 BENIGN ESSENTIAL HYPERTENSION: ICD-10-CM

## 2023-09-12 DIAGNOSIS — E78.5 HYPERLIPIDEMIA, UNSPECIFIED HYPERLIPIDEMIA TYPE: ICD-10-CM

## 2023-09-12 DIAGNOSIS — E03.9 HYPOTHYROIDISM, UNSPECIFIED TYPE: ICD-10-CM

## 2023-09-12 DIAGNOSIS — Z09 HOSPITAL DISCHARGE FOLLOW-UP: Primary | ICD-10-CM

## 2023-09-12 DIAGNOSIS — Z23 NEED FOR INFLUENZA VACCINATION: ICD-10-CM

## 2023-09-12 DIAGNOSIS — Z29.11 NEED FOR RSV IMMUNIZATION: ICD-10-CM

## 2023-09-12 DIAGNOSIS — U07.1 COVID-19 VIRUS INFECTION: ICD-10-CM

## 2023-09-12 DIAGNOSIS — Z12.5 SCREENING FOR PROSTATE CANCER: ICD-10-CM

## 2023-09-12 DIAGNOSIS — I26.99 ACUTE PULMONARY EMBOLISM, UNSPECIFIED PULMONARY EMBOLISM TYPE, UNSPECIFIED WHETHER ACUTE COR PULMONALE PRESENT: ICD-10-CM

## 2023-09-12 PROCEDURE — G0008 ADMIN INFLUENZA VIRUS VAC: HCPCS | Mod: PBBFAC | Performed by: NURSE PRACTITIONER

## 2023-09-12 PROCEDURE — 99204 OFFICE O/P NEW MOD 45 MIN: CPT | Mod: S$PBB,,, | Performed by: NURSE PRACTITIONER

## 2023-09-12 PROCEDURE — 99204 PR OFFICE/OUTPT VISIT, NEW, LEVL IV, 45-59 MIN: ICD-10-PCS | Mod: S$PBB,,, | Performed by: NURSE PRACTITIONER

## 2023-09-12 PROCEDURE — 99999PBSHW FLU VACCINE - QUADRIVALENT - HIGH DOSE (65+) PRESERVATIVE FREE IM: ICD-10-PCS | Mod: PBBFAC,,,

## 2023-09-12 PROCEDURE — 99214 OFFICE O/P EST MOD 30 MIN: CPT | Performed by: NURSE PRACTITIONER

## 2023-09-12 PROCEDURE — 99999PBSHW FLU VACCINE - QUADRIVALENT - HIGH DOSE (65+) PRESERVATIVE FREE IM: Mod: PBBFAC,,,

## 2023-09-12 RX ORDER — SILDENAFIL 100 MG/1
50 TABLET, FILM COATED ORAL
COMMUNITY
Start: 2022-11-28

## 2023-09-12 RX ORDER — LANOLIN ALCOHOL/MO/W.PET/CERES
2 CREAM (GRAM) TOPICAL DAILY
COMMUNITY
Start: 2022-11-28

## 2023-09-12 RX ORDER — FLUTICASONE PROPIONATE 50 MCG
SPRAY, SUSPENSION (ML) NASAL
COMMUNITY
Start: 2022-11-28

## 2023-09-12 RX ORDER — RSV VACC, PREF A AND PREF B/PF 120MCG/0.5
120 VIAL (EA) INTRAMUSCULAR ONCE
Qty: 0.5 ML | Refills: 0 | Status: SHIPPED | OUTPATIENT
Start: 2023-09-12 | End: 2023-09-12
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
## 2023-09-13 ENCOUNTER — TELEPHONE (OUTPATIENT)
Dept: FAMILY MEDICINE | Facility: CLINIC | Age: 73
End: 2023-09-13
Payer: MEDICARE

## 2023-09-24 LAB
ALBUMIN SERPL-MCNC: 4.2 G/DL (ref 3.6–5.1)
ALBUMIN/GLOB SERPL: 1.6 (CALC) (ref 1–2.5)
ALP SERPL-CCNC: 45 U/L (ref 35–144)
ALT SERPL-CCNC: 15 U/L (ref 9–46)
AST SERPL-CCNC: 17 U/L (ref 10–35)
BASOPHILS # BLD AUTO: 18 CELLS/UL (ref 0–200)
BASOPHILS NFR BLD AUTO: 0.4 %
BILIRUB SERPL-MCNC: 0.6 MG/DL (ref 0.2–1.2)
BUN SERPL-MCNC: 7 MG/DL (ref 7–25)
BUN/CREAT SERPL: ABNORMAL (CALC) (ref 6–22)
CALCIUM SERPL-MCNC: 9.7 MG/DL (ref 8.6–10.3)
CHLORIDE SERPL-SCNC: 105 MMOL/L (ref 98–110)
CHOLEST SERPL-MCNC: 146 MG/DL
CHOLEST/HDLC SERPL: 2.8 (CALC)
CO2 SERPL-SCNC: 27 MMOL/L (ref 20–32)
CREAT SERPL-MCNC: 0.94 MG/DL (ref 0.7–1.28)
EGFR: 85 ML/MIN/1.73M2
EOSINOPHIL # BLD AUTO: 207 CELLS/UL (ref 15–500)
EOSINOPHIL NFR BLD AUTO: 4.6 %
ERYTHROCYTE [DISTWIDTH] IN BLOOD BY AUTOMATED COUNT: 12.8 % (ref 11–15)
GLOBULIN SER CALC-MCNC: 2.6 G/DL (CALC) (ref 1.9–3.7)
GLUCOSE SERPL-MCNC: 104 MG/DL (ref 65–99)
HCT VFR BLD AUTO: 41.2 % (ref 38.5–50)
HCV AB SERPL QL IA: NORMAL
HDLC SERPL-MCNC: 53 MG/DL
HGB BLD-MCNC: 14 G/DL (ref 13.2–17.1)
LDLC SERPL CALC-MCNC: 79 MG/DL (CALC)
LYMPHOCYTES # BLD AUTO: 2291 CELLS/UL (ref 850–3900)
LYMPHOCYTES NFR BLD AUTO: 50.9 %
MCH RBC QN AUTO: 30.2 PG (ref 27–33)
MCHC RBC AUTO-ENTMCNC: 34 G/DL (ref 32–36)
MCV RBC AUTO: 89 FL (ref 80–100)
MONOCYTES # BLD AUTO: 648 CELLS/UL (ref 200–950)
MONOCYTES NFR BLD AUTO: 14.4 %
NEUTROPHILS # BLD AUTO: 1337 CELLS/UL (ref 1500–7800)
NEUTROPHILS NFR BLD AUTO: 29.7 %
NONHDLC SERPL-MCNC: 93 MG/DL (CALC)
PLATELET # BLD AUTO: 376 THOUSAND/UL (ref 140–400)
PMV BLD REES-ECKER: 11.2 FL (ref 7.5–12.5)
POTASSIUM SERPL-SCNC: 4.3 MMOL/L (ref 3.5–5.3)
PROT SERPL-MCNC: 6.8 G/DL (ref 6.1–8.1)
PSA SERPL-MCNC: 0.79 NG/ML
RBC # BLD AUTO: 4.63 MILLION/UL (ref 4.2–5.8)
SERVICE CMNT-IMP: ABNORMAL
SODIUM SERPL-SCNC: 141 MMOL/L (ref 135–146)
TRIGL SERPL-MCNC: 63 MG/DL
TSH SERPL-ACNC: 3.84 MIU/L (ref 0.4–4.5)
WBC # BLD AUTO: 4.5 THOUSAND/UL (ref 3.8–10.8)

## 2023-10-02 ENCOUNTER — TELEPHONE (OUTPATIENT)
Dept: FAMILY MEDICINE | Facility: CLINIC | Age: 73
End: 2023-10-02
Payer: MEDICARE

## 2023-10-04 ENCOUNTER — TELEPHONE (OUTPATIENT)
Dept: FAMILY MEDICINE | Facility: CLINIC | Age: 73
End: 2023-10-04

## 2023-10-04 NOTE — TELEPHONE ENCOUNTER
----- Message from Sonya Baron sent at 10/4/2023  3:36 PM CDT -----  Type:  Needs Medical Advice    Who Called: pt   Best Call Back Number: [phone] (home)     Additional Information:  Requesting call back  wants another referral urology     please advise thank you

## 2023-10-25 ENCOUNTER — TELEPHONE (OUTPATIENT)
Dept: UROLOGY | Facility: CLINIC | Age: 73
End: 2023-10-25

## 2023-10-25 ENCOUNTER — OFFICE VISIT (OUTPATIENT)
Dept: UROLOGY | Facility: CLINIC | Age: 73
End: 2023-10-25
Payer: MEDICARE

## 2023-10-25 VITALS — HEIGHT: 72 IN | WEIGHT: 178 LBS | BODY MASS INDEX: 24.11 KG/M2

## 2023-10-25 DIAGNOSIS — N52.8 OTHER MALE ERECTILE DYSFUNCTION: ICD-10-CM

## 2023-10-25 DIAGNOSIS — C61 CARCINOMA OF PROSTATE: Primary | ICD-10-CM

## 2023-10-25 PROCEDURE — 99204 OFFICE O/P NEW MOD 45 MIN: CPT | Mod: S$PBB,,, | Performed by: UROLOGY

## 2023-10-25 PROCEDURE — 99999 PR PBB SHADOW E&M-EST. PATIENT-LVL IV: CPT | Mod: PBBFAC,,, | Performed by: UROLOGY

## 2023-10-25 PROCEDURE — 99999 PR PBB SHADOW E&M-EST. PATIENT-LVL IV: ICD-10-PCS | Mod: PBBFAC,,, | Performed by: UROLOGY

## 2023-10-25 PROCEDURE — 99204 PR OFFICE/OUTPT VISIT, NEW, LEVL IV, 45-59 MIN: ICD-10-PCS | Mod: S$PBB,,, | Performed by: UROLOGY

## 2023-10-25 PROCEDURE — 99214 OFFICE O/P EST MOD 30 MIN: CPT | Mod: PBBFAC,PO | Performed by: UROLOGY

## 2023-10-25 NOTE — TELEPHONE ENCOUNTER
Faxed Prostate Cancer record request and KHALIDA to [facility] in [facility].

## 2023-10-25 NOTE — PROGRESS NOTES
[facility] New Patient/H&P:    Rosemary Lee is a 73 y.o. male who presents for to establish care for history of prostate cancer.     Patient with a history of pulmonary embolus on Eliquis, HTN, hypothyroidism, degenerative disc disease and erectile dysfunction who presents to establish care for a history of prostate cancer.     He presents with presumed [place] 7 prostate cancer s/p [facility] in [facility] [place] [place] [facility]. Also underwent ADT after radiation therapy. He has no records available.     States his PSA has mostly remained low and stable. No lower urinary tract complaints.     He has well-controlled erectile dysfunction on Viagra 50 mg as needed.      Retired . [employer] .     Denies any fever, chills, gross hematuria, flank pain, bone pain, unintentional weight loss or  trauma.       PSA  0.79  9/22/23    Past Medical History:   Diagnosis Date    COVID     DDD (degenerative disc disease), lumbosacral     GERD (gastroesophageal reflux disease)        Past Surgical History:   Procedure Laterality Date    SKIN BIOPSY         Family History   Problem Relation Age of Onset    Hypertension Mother        Review of patient's allergies indicates:   Allergen Reactions    Oxycodone-acetaminophen        Medications Reviewed: see MAR      FOCUSED PHYSICAL EXAM:    There were no vitals filed for this visit.  Body mass index is 24.14 kg/m². Weight: 80.7 kg (178 lb) Height: 6' (182.9 cm)       General: Alert, cooperative, no distress, appears stated age  Abdomen: Soft, non-tender, no CVA tenderness, non-distended        LABS:    No results found for this or any previous visit (from the past 336 hour(s)).        Assessment/Diagnosis:    1. Carcinoma of prostate  Ambulatory referral/consult to Urology    PROSTATE SPECIFIC ANTIGEN, DIAGNOSTIC      2. Other male erectile dysfunction            Plans:    - I spent 45 minutes of the day of this encounter  preparing for, treating and managing this patient. Reviewed the NCCN guidelines with patient regarding surveillance after XRT which he completed in 2016. We discussed that he will require PSA every 6 months for approximately 5 years. PSA currently 0.79.   - Will continue to monitor closely as I am unsure of his lakshmi PSA.   - Continue Viagra 50 mg as needed for ED.   - RTC in 6 months with PSA prior. [facility] [place], [place].     **Some records received from radiation oncologist.  Patient with [place] 4+3=7 disease. PSA has ranged from 0.3 to 0.7 since completing therapy.

## 2023-10-30 ENCOUNTER — CLINICAL SUPPORT (OUTPATIENT)
Dept: CARDIOLOGY | Facility: HOSPITAL | Age: 73
End: 2023-10-30
Attending: EMERGENCY MEDICINE
Payer: MEDICARE

## 2023-10-30 ENCOUNTER — HOSPITAL ENCOUNTER (OUTPATIENT)
Dept: RADIOLOGY | Facility: HOSPITAL | Age: 73
Discharge: HOME | End: 2023-10-30
Attending: EMERGENCY MEDICINE | Admitting: INTERNAL MEDICINE
Payer: MEDICARE

## 2023-10-30 ENCOUNTER — HOSPITAL ENCOUNTER (OUTPATIENT)
Facility: HOSPITAL | Age: 73
Discharge: HOME | End: 2023-10-30
Attending: EMERGENCY MEDICINE | Admitting: INTERNAL MEDICINE
Payer: MEDICARE

## 2023-10-30 ENCOUNTER — CLINICAL SUPPORT (OUTPATIENT)
Dept: CARDIOLOGY | Facility: HOSPITAL | Age: 73
End: 2023-10-30
Attending: INTERNAL MEDICINE
Payer: MEDICARE

## 2023-10-30 VITALS
SYSTOLIC BLOOD PRESSURE: 117 MMHG | DIASTOLIC BLOOD PRESSURE: 62 MMHG | OXYGEN SATURATION: 99 % | WEIGHT: 176.19 LBS | RESPIRATION RATE: 18 BRPM | BODY MASS INDEX: 23.86 KG/M2 | TEMPERATURE: 98 F | HEART RATE: 60 BPM | HEIGHT: 72 IN

## 2023-10-30 VITALS — HEIGHT: 72 IN | WEIGHT: 176.13 LBS | BODY MASS INDEX: 23.86 KG/M2

## 2023-10-30 DIAGNOSIS — R07.9 CHEST PAIN: Primary | ICD-10-CM

## 2023-10-30 LAB
ALBUMIN SERPL BCP-MCNC: 4.5 G/DL (ref 3.5–5.2)
ALP SERPL-CCNC: 43 U/L (ref 55–135)
ALT SERPL W/O P-5'-P-CCNC: 15 U/L (ref 10–44)
ANION GAP SERPL CALC-SCNC: 7 MMOL/L (ref 8–16)
AORTIC ROOT ANNULUS: 2.9 CM
AORTIC VALVE CUSP SEPERATION: 1.8 CM
AST SERPL-CCNC: 16 U/L (ref 10–40)
AV INDEX (PROSTH): 0.82
AV MEAN GRADIENT: 3 MMHG
AV PEAK GRADIENT: 5 MMHG
AV VALVE AREA BY VELOCITY RATIO: 2.17 CM²
AV VALVE AREA: 2.09 CM²
AV VELOCITY RATIO: 0.85
BASOPHILS # BLD AUTO: 0.03 K/UL (ref 0–0.2)
BASOPHILS # BLD AUTO: 0.04 K/UL (ref 0–0.2)
BASOPHILS NFR BLD: 0.6 % (ref 0–1.9)
BASOPHILS NFR BLD: 0.7 % (ref 0–1.9)
BILIRUB SERPL-MCNC: 0.7 MG/DL (ref 0.1–1)
BNP SERPL-MCNC: 16 PG/ML (ref 0–99)
BSA FOR ECHO PROCEDURE: 2.01 M2
BUN SERPL-MCNC: 8 MG/DL (ref 8–23)
CALCIUM SERPL-MCNC: 9.4 MG/DL (ref 8.7–10.5)
CHLORIDE SERPL-SCNC: 102 MMOL/L (ref 95–110)
CO2 SERPL-SCNC: 27 MMOL/L (ref 23–29)
CREAT SERPL-MCNC: 1.1 MG/DL (ref 0.5–1.4)
CV ECHO LV RWT: 0.35 CM
DIFFERENTIAL METHOD: ABNORMAL
DIFFERENTIAL METHOD: ABNORMAL
DOP CALC AO PEAK VEL: 1.15 M/S
DOP CALC AO VTI: 29.8 CM
DOP CALC LVOT AREA: 2.5 CM2
DOP CALC LVOT DIAMETER: 1.8 CM
DOP CALC LVOT PEAK VEL: 0.98 M/S
DOP CALC LVOT STROKE VOLUME: 62.31 CM3
DOP CALC MV VTI: 30 CM
DOP CALCLVOT PEAK VEL VTI: 24.5 CM
E WAVE DECELERATION TIME: 204 MSEC
E/A RATIO: 0.95
E/E' RATIO: 8.33 M/S
ECHO LV POSTERIOR WALL: 0.73 CM (ref 0.6–1.1)
EOSINOPHIL # BLD AUTO: 0.2 K/UL (ref 0–0.5)
EOSINOPHIL # BLD AUTO: 0.3 K/UL (ref 0–0.5)
EOSINOPHIL NFR BLD: 4.3 % (ref 0–8)
EOSINOPHIL NFR BLD: 5.2 % (ref 0–8)
ERYTHROCYTE [DISTWIDTH] IN BLOOD BY AUTOMATED COUNT: 12.6 % (ref 11.5–14.5)
ERYTHROCYTE [DISTWIDTH] IN BLOOD BY AUTOMATED COUNT: 12.8 % (ref 11.5–14.5)
EST. GFR  (NO RACE VARIABLE): >60 ML/MIN/1.73 M^2
FRACTIONAL SHORTENING: 29 % (ref 28–44)
GLUCOSE SERPL-MCNC: 98 MG/DL (ref 70–110)
HCT VFR BLD AUTO: 39.3 % (ref 40–54)
HCT VFR BLD AUTO: 40.1 % (ref 40–54)
HGB BLD-MCNC: 13.3 G/DL (ref 14–18)
HGB BLD-MCNC: 13.7 G/DL (ref 14–18)
IMM GRANULOCYTES # BLD AUTO: 0.01 K/UL (ref 0–0.04)
IMM GRANULOCYTES # BLD AUTO: 0.01 K/UL (ref 0–0.04)
IMM GRANULOCYTES NFR BLD AUTO: 0.2 % (ref 0–0.5)
IMM GRANULOCYTES NFR BLD AUTO: 0.2 % (ref 0–0.5)
INR PPP: 1.1 (ref 0.8–1.2)
INTERVENTRICULAR SEPTUM: 1.16 CM (ref 0.6–1.1)
IVC DIAMETER: 1.85 CM
LEFT INTERNAL DIMENSION IN SYSTOLE: 3 CM (ref 2.1–4)
LEFT VENTRICLE DIASTOLIC VOLUME INDEX: 39.11 ML/M2
LEFT VENTRICLE DIASTOLIC VOLUME: 79 ML
LEFT VENTRICLE MASS INDEX: 63 G/M2
LEFT VENTRICLE SYSTOLIC VOLUME INDEX: 17.3 ML/M2
LEFT VENTRICLE SYSTOLIC VOLUME: 35 ML
LEFT VENTRICULAR INTERNAL DIMENSION IN DIASTOLE: 4.21 CM (ref 3.5–6)
LEFT VENTRICULAR MASS: 127.37 G
LV LATERAL E/E' RATIO: 7.5 M/S
LV SEPTAL E/E' RATIO: 9.38 M/S
LVOT MG: 2 MMHG
LVOT MV: 0.6 CM/S
LYMPHOCYTES # BLD AUTO: 1.7 K/UL (ref 1–4.8)
LYMPHOCYTES # BLD AUTO: 2.8 K/UL (ref 1–4.8)
LYMPHOCYTES NFR BLD: 37.5 % (ref 18–48)
LYMPHOCYTES NFR BLD: 47.4 % (ref 18–48)
MCH RBC QN AUTO: 30 PG (ref 27–31)
MCH RBC QN AUTO: 30.4 PG (ref 27–31)
MCHC RBC AUTO-ENTMCNC: 33.8 G/DL (ref 32–36)
MCHC RBC AUTO-ENTMCNC: 34.2 G/DL (ref 32–36)
MCV RBC AUTO: 89 FL (ref 82–98)
MCV RBC AUTO: 89 FL (ref 82–98)
MONOCYTES # BLD AUTO: 0.5 K/UL (ref 0.3–1)
MONOCYTES # BLD AUTO: 0.7 K/UL (ref 0.3–1)
MONOCYTES NFR BLD: 10.8 % (ref 4–15)
MONOCYTES NFR BLD: 11.1 % (ref 4–15)
MV MEAN GRADIENT: 1 MMHG
MV PEAK A VEL: 0.79 M/S
MV PEAK E VEL: 0.75 M/S
MV PEAK GRADIENT: 3 MMHG
MV VALVE AREA BY CONTINUITY EQUATION: 2.08 CM2
NEUTROPHILS # BLD AUTO: 2.1 K/UL (ref 1.8–7.7)
NEUTROPHILS # BLD AUTO: 2.1 K/UL (ref 1.8–7.7)
NEUTROPHILS NFR BLD: 36.3 % (ref 38–73)
NEUTROPHILS NFR BLD: 45.7 % (ref 38–73)
NRBC BLD-RTO: 0 /100 WBC
NRBC BLD-RTO: 0 /100 WBC
PISA TR MAX VEL: 2.41 M/S
PLATELET # BLD AUTO: 342 K/UL (ref 150–450)
PLATELET # BLD AUTO: 347 K/UL (ref 150–450)
PMV BLD AUTO: 10.5 FL (ref 9.2–12.9)
PMV BLD AUTO: 10.9 FL (ref 9.2–12.9)
POTASSIUM SERPL-SCNC: 3.5 MMOL/L (ref 3.5–5.1)
PROT SERPL-MCNC: 7.4 G/DL (ref 6–8.4)
PROTHROMBIN TIME: 12.4 SEC (ref 9–12.5)
PV MV: 0.67 M/S
PV PEAK GRADIENT: 4 MMHG
PV PEAK VELOCITY: 0.99 M/S
RBC # BLD AUTO: 4.43 M/UL (ref 4.6–6.2)
RBC # BLD AUTO: 4.5 M/UL (ref 4.6–6.2)
RV TISSUE DOPPLER FREE WALL SYSTOLIC VELOCITY 1 (APICAL 4 CHAMBER VIEW): 16.4 CM/S
SODIUM SERPL-SCNC: 136 MMOL/L (ref 136–145)
TDI LATERAL: 0.1 M/S
TDI SEPTAL: 0.08 M/S
TDI: 0.09 M/S
TR MAX PG: 23 MMHG
TRICUSPID ANNULAR PLANE SYSTOLIC EXCURSION: 3.07 CM
TROPONIN I SERPL HS-MCNC: 6.2 PG/ML (ref 0–14.9)
TROPONIN I SERPL HS-MCNC: 6.3 PG/ML (ref 0–14.9)
TROPONIN I SERPL HS-MCNC: 6.5 PG/ML (ref 0–14.9)
WBC # BLD AUTO: 4.64 K/UL (ref 3.9–12.7)
WBC # BLD AUTO: 5.88 K/UL (ref 3.9–12.7)
Z-SCORE OF LEFT VENTRICULAR DIMENSION IN END DIASTOLE: -3.46
Z-SCORE OF LEFT VENTRICULAR DIMENSION IN END SYSTOLE: -1.55

## 2023-10-30 PROCEDURE — 93010 EKG 12-LEAD: ICD-10-PCS | Mod: ,,, | Performed by: GENERAL PRACTICE

## 2023-10-30 PROCEDURE — G0378 HOSPITAL OBSERVATION PER HR: HCPCS

## 2023-10-30 PROCEDURE — 93306 TTE W/DOPPLER COMPLETE: CPT | Mod: 26,,, | Performed by: INTERNAL MEDICINE

## 2023-10-30 PROCEDURE — 84484 ASSAY OF TROPONIN QUANT: CPT | Mod: 91 | Performed by: INTERNAL MEDICINE

## 2023-10-30 PROCEDURE — 85025 COMPLETE CBC W/AUTO DIFF WBC: CPT | Performed by: INTERNAL MEDICINE

## 2023-10-30 PROCEDURE — 99213 OFFICE O/P EST LOW 20 MIN: CPT | Mod: 25,,, | Performed by: INTERNAL MEDICINE

## 2023-10-30 PROCEDURE — 36415 COLL VENOUS BLD VENIPUNCTURE: CPT | Performed by: INTERNAL MEDICINE

## 2023-10-30 PROCEDURE — 25000003 PHARM REV CODE 250: Performed by: STUDENT IN AN ORGANIZED HEALTH CARE EDUCATION/TRAINING PROGRAM

## 2023-10-30 PROCEDURE — 93306 ECHO (CUPID ONLY): ICD-10-PCS | Mod: 26,,, | Performed by: INTERNAL MEDICINE

## 2023-10-30 PROCEDURE — 93017 CV STRESS TEST TRACING ONLY: CPT

## 2023-10-30 PROCEDURE — 83880 ASSAY OF NATRIURETIC PEPTIDE: CPT | Performed by: EMERGENCY MEDICINE

## 2023-10-30 PROCEDURE — 78452 HT MUSCLE IMAGE SPECT MULT: CPT | Mod: TC

## 2023-10-30 PROCEDURE — 85610 PROTHROMBIN TIME: CPT | Performed by: EMERGENCY MEDICINE

## 2023-10-30 PROCEDURE — 93306 TTE W/DOPPLER COMPLETE: CPT

## 2023-10-30 PROCEDURE — 93016 CV STRESS TEST SUPVJ ONLY: CPT | Mod: ,,, | Performed by: INTERNAL MEDICINE

## 2023-10-30 PROCEDURE — 99213 PR OFFICE/OUTPT VISIT, EST, LEVL III, 20-29 MIN: ICD-10-PCS | Mod: 25,,, | Performed by: INTERNAL MEDICINE

## 2023-10-30 PROCEDURE — 93018 CV STRESS TEST I&R ONLY: CPT | Mod: ,,, | Performed by: INTERNAL MEDICINE

## 2023-10-30 PROCEDURE — 85025 COMPLETE CBC W/AUTO DIFF WBC: CPT | Mod: 91 | Performed by: EMERGENCY MEDICINE

## 2023-10-30 PROCEDURE — 63600175 PHARM REV CODE 636 W HCPCS: Performed by: STUDENT IN AN ORGANIZED HEALTH CARE EDUCATION/TRAINING PROGRAM

## 2023-10-30 PROCEDURE — 99285 EMERGENCY DEPT VISIT HI MDM: CPT | Mod: 25

## 2023-10-30 PROCEDURE — 93005 ELECTROCARDIOGRAM TRACING: CPT | Performed by: GENERAL PRACTICE

## 2023-10-30 PROCEDURE — 93018 NUCLEAR STRESS TEST (CUPID ONLY): ICD-10-PCS | Mod: ,,, | Performed by: INTERNAL MEDICINE

## 2023-10-30 PROCEDURE — 80053 COMPREHEN METABOLIC PANEL: CPT | Performed by: EMERGENCY MEDICINE

## 2023-10-30 PROCEDURE — 93016 NUCLEAR STRESS TEST (CUPID ONLY): ICD-10-PCS | Mod: ,,, | Performed by: INTERNAL MEDICINE

## 2023-10-30 PROCEDURE — 93010 ELECTROCARDIOGRAM REPORT: CPT | Mod: ,,, | Performed by: GENERAL PRACTICE

## 2023-10-30 PROCEDURE — 25000003 PHARM REV CODE 250: Performed by: INTERNAL MEDICINE

## 2023-10-30 PROCEDURE — 84484 ASSAY OF TROPONIN QUANT: CPT | Mod: 91 | Performed by: EMERGENCY MEDICINE

## 2023-10-30 RX ORDER — ATORVASTATIN CALCIUM 40 MG/1
40 TABLET, FILM COATED ORAL DAILY
Status: DISCONTINUED | OUTPATIENT
Start: 2023-10-30 | End: 2023-10-30 | Stop reason: HOSPADM

## 2023-10-30 RX ORDER — ACETAMINOPHEN 325 MG/1
650 TABLET ORAL EVERY 4 HOURS PRN
Status: DISCONTINUED | OUTPATIENT
Start: 2023-10-30 | End: 2023-10-30 | Stop reason: HOSPADM

## 2023-10-30 RX ORDER — LEVOTHYROXINE SODIUM 25 UG/1
25 TABLET ORAL
Status: DISCONTINUED | OUTPATIENT
Start: 2023-10-30 | End: 2023-10-30 | Stop reason: HOSPADM

## 2023-10-30 RX ORDER — SODIUM CHLORIDE 0.9 % (FLUSH) 0.9 %
10 SYRINGE (ML) INJECTION
Status: DISCONTINUED | OUTPATIENT
Start: 2023-10-30 | End: 2023-10-30 | Stop reason: HOSPADM

## 2023-10-30 RX ORDER — REGADENOSON 0.08 MG/ML
0.4 INJECTION, SOLUTION INTRAVENOUS
Status: COMPLETED | OUTPATIENT
Start: 2023-10-30 | End: 2023-10-30

## 2023-10-30 RX ORDER — LISINOPRIL AND HYDROCHLOROTHIAZIDE 20; 25 MG/1; MG/1
1 TABLET ORAL DAILY
Status: DISCONTINUED | OUTPATIENT
Start: 2023-10-30 | End: 2023-10-30

## 2023-10-30 RX ORDER — ATORVASTATIN CALCIUM 40 MG/1
80 TABLET, FILM COATED ORAL DAILY
Status: DISCONTINUED | OUTPATIENT
Start: 2023-10-30 | End: 2023-10-30

## 2023-10-30 RX ORDER — HYDROCHLOROTHIAZIDE 25 MG/1
25 TABLET ORAL DAILY
Status: DISCONTINUED | OUTPATIENT
Start: 2023-10-30 | End: 2023-10-30 | Stop reason: HOSPADM

## 2023-10-30 RX ORDER — FLUTICASONE PROPIONATE 50 MCG
2 SPRAY, SUSPENSION (ML) NASAL DAILY
Status: DISCONTINUED | OUTPATIENT
Start: 2023-10-30 | End: 2023-10-30 | Stop reason: HOSPADM

## 2023-10-30 RX ORDER — ASPIRIN 325 MG
325 TABLET ORAL
Status: COMPLETED | OUTPATIENT
Start: 2023-10-30 | End: 2023-10-30

## 2023-10-30 RX ORDER — LISINOPRIL 20 MG/1
20 TABLET ORAL DAILY
Status: DISCONTINUED | OUTPATIENT
Start: 2023-10-30 | End: 2023-10-30 | Stop reason: HOSPADM

## 2023-10-30 RX ORDER — CLONIDINE HYDROCHLORIDE 0.1 MG/1
0.1 TABLET ORAL EVERY 8 HOURS PRN
Qty: 60 TABLET | Refills: 0 | Status: SHIPPED | OUTPATIENT
Start: 2023-10-30

## 2023-10-30 RX ORDER — FERROUS SULFATE, DRIED 160(50) MG
1 TABLET, EXTENDED RELEASE ORAL DAILY
Status: DISCONTINUED | OUTPATIENT
Start: 2023-10-30 | End: 2023-10-30 | Stop reason: HOSPADM

## 2023-10-30 RX ORDER — NIFEDIPINE 30 MG/1
30 TABLET, EXTENDED RELEASE ORAL DAILY
Status: DISCONTINUED | OUTPATIENT
Start: 2023-10-30 | End: 2023-10-30 | Stop reason: HOSPADM

## 2023-10-30 RX ADMIN — LISINOPRIL 20 MG: 20 TABLET ORAL at 12:10

## 2023-10-30 RX ADMIN — REGADENOSON 0.4 MG: 0.08 INJECTION, SOLUTION INTRAVENOUS at 10:10

## 2023-10-30 RX ADMIN — LEVOTHYROXINE SODIUM 25 MCG: 0.03 TABLET ORAL at 05:10

## 2023-10-30 RX ADMIN — ASPIRIN 325 MG ORAL TABLET 325 MG: 325 PILL ORAL at 02:10

## 2023-10-30 RX ADMIN — HYDROCHLOROTHIAZIDE 25 MG: 25 TABLET ORAL at 12:10

## 2023-10-30 RX ADMIN — Medication 1 TABLET: at 12:10

## 2023-10-30 RX ADMIN — ATORVASTATIN CALCIUM 40 MG: 40 TABLET, FILM COATED ORAL at 12:10

## 2023-10-30 NOTE — ED PROVIDER NOTES
Encounter Date: 10/30/2023       History     Chief Complaint   Patient presents with    Chest Pain     C/o chest pain and htn     HPI    73-year-old male with a past medical history of hypertension, PE on Eliquis, hypothyroidism, prostate cancer status post radiation presents to the emergency department for hypertension and chest pain.  Patient states that today his blood pressure has been more elevated than its baseline.  States that his systolics today was in the 180s.  Baseline it is typically in the 130s.  While driving to the emergency department patient began having substernal pressurized like chest pain associated with some nausea.  States that this was nonradiating.  This has since resolved once getting into the ER bed however this is atypical for patient as well.  Denies any shortness of breath, diaphoresis, emesis, viral symptoms.    Review of patient's allergies indicates:   Allergen Reactions    Oxycodone-acetaminophen      Past Medical History:   Diagnosis Date    COVID     DDD (degenerative disc disease), lumbosacral     GERD (gastroesophageal reflux disease)      Past Surgical History:   Procedure Laterality Date    SKIN BIOPSY       Family History   Problem Relation Age of Onset    Hypertension Mother      Social History     Tobacco Use    Smoking status: Former     Types: Cigarettes    Smokeless tobacco: Never   Substance Use Topics    Alcohol use: Yes    Drug use: Yes     Types: Marijuana     Review of Systems   Constitutional:  Negative for chills and fever.   HENT:  Negative for ear pain and sore throat.    Eyes:  Negative for pain and redness.   Respiratory:  Negative for cough and shortness of breath.    Cardiovascular:  Positive for chest pain. Negative for leg swelling.   Gastrointestinal:  Negative for abdominal pain, nausea and vomiting.   Genitourinary:  Negative for dysuria and flank pain.   Musculoskeletal:  Negative for joint swelling and neck pain.   Skin:  Negative for color change  and wound.   Neurological:  Positive for light-headedness. Negative for syncope and headaches.       Physical Exam     Initial Vitals   BP Pulse Resp Temp SpO2   10/30/23 0119 10/30/23 0119 10/30/23 0119 10/30/23 0120 10/30/23 0119   (!) 183/80 69 20 98.3 °F (36.8 °C) 95 %      MAP       --                Physical Exam    Constitutional: He appears well-developed and well-nourished. He is not diaphoretic. No distress.   HENT:   Head: Normocephalic and atraumatic.   Nose: Nose normal.   Eyes: Conjunctivae and EOM are normal.   Neck: No tracheal deviation present.   Normal range of motion.  Cardiovascular:  Normal rate and regular rhythm.     Exam reveals no friction rub.       No murmur heard.  Pulmonary/Chest: Breath sounds normal. No respiratory distress. He has no wheezes.   Abdominal: Abdomen is soft. He exhibits no distension. There is no abdominal tenderness.   Musculoskeletal:         General: No tenderness. Normal range of motion.      Cervical back: Normal range of motion.     Neurological: He is alert and oriented to person, place, and time. GCS score is 15. GCS eye subscore is 4. GCS verbal subscore is 5. GCS motor subscore is 6.   Skin: Skin is warm. Capillary refill takes less than 2 seconds. No erythema.         ED Course   Procedures  Labs Reviewed   CBC W/ AUTO DIFFERENTIAL - Abnormal; Notable for the following components:       Result Value    RBC 4.50 (*)     Hemoglobin 13.7 (*)     Gran % 36.3 (*)     All other components within normal limits   PROTIME-INR   COMPREHENSIVE METABOLIC PANEL   B-TYPE NATRIURETIC PEPTIDE   TROPONIN I HIGH SENSITIVITY   TROPONIN I HIGH SENSITIVITY     EKG Readings: (Independently Interpreted)   Initial Reading: No STEMI. Rhythm: Normal Sinus Rhythm. Heart Rate: 71. ST Segments: Normal ST Segments. T Waves: Normal. Clinical Impression: Normal Sinus Rhythm       Imaging Results              X-Ray Chest AP Portable (In process)                   X-Rays:   Independently  Interpreted Readings:   Other Readings:  On my interpretation chest x-ray appears negative for acute cardiopulmonary process.    Medications - No data to display  Medical Decision Making    Rosemary Lee is a 73 y.o. male who presents for evaluation of hypertension and chest pain.  Patient has a blood pressure of 183/80 on arrival.  Remainder of vitals within normal limits.  Differential includes ACS, arrhythmia, symptomatic hypertension, hypertensive emergency, pneumonia, effusion, pneumothorax.  Cardiac workup initiated.  My interpretation, CBC within normal limits.  No leukocytosis.  CMP within normal limits.  Coags within normal limits.  Troponin is 6.2 and BNP is 16.  Chest x-ray without acute cardiopulmonary process and EKG without acute ischemic changes.  Patient took his antihypertensive 1 hour prior to arrival and now blood pressure is 137 over 60s.  Additionally states that his chest pain is resolved.  Have note patient has not had a cardiac workup such as a stress test and echo performed.  [place]  Currently takes anticoagulants for a PE.  Score is 4.  Given suspicious story, we will admit to hospitalist for further obs and inpatient workup.  Case discussed with Dr. Kaur.    Fred Arroyo MD  [facility] Emergency Medicine PGY-3  10/30/98460:12 AM       Amount and/or Complexity of Data Reviewed  Labs: ordered. Decision-making details documented in ED Course.  Radiology: ordered. Decision-making details documented in ED Course.  ECG/medicine tests:  Decision-making details documented in ED Course.                                 Clinical Impression:   Final diagnoses:  [R07.9] Chest pain               Fred Arroyo MD  Resident  10/30/23 2566

## 2023-10-30 NOTE — PLAN OF CARE
Critical access hospital  Initial Discharge Assessment       Primary Care Provider: Uriel Clark NP    Admission Diagnosis: Chest pain [R07.9]    Admission Date: 10/30/2023  Expected Discharge Date: 10/30/2023     completed discharge assessment with spouse (Margie Kaur (Spouse)   [phone] (Mobile)). Pt away at stress test. Pt lives spouse. Demographics, PCP, and insurance verified. No home health. No dialysis. Pt completes ADLs without assistance. Pt to discharge home via family transport. Pt has no other needs to be addressed at this time.    Transition of Care Barriers: None    Payor: MEDICARE / Plan: MEDICARE PART A & B / Product Type: Government /     Extended Emergency Contact Information  Primary Emergency Contact: Margie Kaur  Address: [address] [place], [place] [address] [address] [place]  Mobile Phone: [phone]  Relation: Spouse    Discharge Plan A: Home with family  Discharge Plan B: Home with family      [employer] [id number] [address] & [address] [address]  Phone: [phone] Fax: [phone]      Initial Assessment (most recent)       Adult Discharge Assessment - 10/30/23 1127          Discharge Assessment    Assessment Type Discharge Planning Assessment     Confirmed/corrected address, phone number and insurance Yes     Confirmed Demographics Correct on Facesheet     Source of Information patient     Does patient/caregiver understand observation status Yes     Communicated NIRMAL with patient/caregiver Yes     Reason For Admission Chest pain     People in Home spouse     Facility Arrived From: Home     Do you expect to return to your current living situation? Yes     Do you have help at home or someone to help you manage your care at home? Yes     Who are your caregiver(s) and their phone number(s)? Margie Kaur (Spouse)   [phone] (Mobile)     Prior to hospitilization cognitive  status: Unable to Assess     Current cognitive status: Unable to Assess     Home Accessibility wheelchair accessible     Home Layout Able to live on 1st floor     Equipment Currently Used at Home none     Readmission within 30 days? No     Patient currently being followed by outpatient case management? No     Do you take prescription medications? Yes     Do you have prescription coverage? Yes     Coverage Payor:  MEDICARE - MEDICARE PART A & B     Do you have any problems affording any of your prescribed medications? No     Is the patient taking medications as prescribed? yes     Who is going to help you get home at discharge? Margie Kaur (Spouse)   [phone] (Mobile)     How do you get to doctors appointments? family or friend will provide;car, drives self     Are you on dialysis? No     Do you take coumadin? No     DME Needed Upon Discharge  none     Discharge Plan discussed with: Spouse/sig other     Name(s) and Number(s) Margie Kaur (Spouse)   [phone] (Mobile)     Transition of Care Barriers None     Discharge Plan A Home with family     Discharge Plan B Home with family        OTHER    Name(s) of People in Home Margie Kaur (Spouse)   [phone] (Mobile)

## 2023-10-30 NOTE — CONSULTS
[facility]  Department of Cardiology  Consult Note      PATIENT NAME: Rosemary Lee    MRN: [id number]  TODAY'S DATE: 10/30/2023  ADMIT DATE: 10/30/2023                          CONSULT REQUESTED BY: Matthew Escobar MD    SUBJECTIVE     PRINCIPAL PROBLEM: <principal problem not specified>      REASON FOR CONSULT:  From H&P: Patient is 73 years old male with a past medical history COVID, acid reflux, PE on Eliquis, prostate cancer, hypertension, hyperlipidemia, who presents to hospital for high blood pressure at home.  The patient reports his baseline blood pressure around 130 but tonight was 180.  On the way to the emergency room, the patient developed substernal chest pain.  The chest pain was 5/10.  The pain was more like pressure pain.  Patient had some nausea but no vomiting.  When the patient arrived here his chest pain resolved.  Patient denied cough, fever, chill, vomiting      HPI:    Patient is a 73-year-old male who presented to the emergency room with complaints of significantly elevated blood pressure at home.  Patient has a history of GERD, PE, prostate cancer, hypertension, and hyperlipidemia.  Patient reported that on the way to the emergency room he developed substernal chest discomfort.  Troponin levels have been negative thus far.  Patient's blood pressure has normalized overnight.        Review of patient's allergies indicates:   Allergen Reactions    Oxycodone-acetaminophen        Past Medical History:   Diagnosis Date    COVID     DDD (degenerative disc disease), lumbosacral     GERD (gastroesophageal reflux disease)      Past Surgical History:   Procedure Laterality Date    SKIN BIOPSY       Social History     Tobacco Use    Smoking status: Former     Types: Cigarettes    Smokeless tobacco: Never   Substance Use Topics    Alcohol use: Yes    Drug use: Yes     Types: Marijuana        REVIEW OF SYSTEMS  CONSTITUTIONAL: Negative for chills, fatigue and fever.   EYES: No double  vision, No blurred vision  NEURO: No headaches, No dizziness  RESPIRATORY: Negative for cough, shortness of breath and wheezing.    CARDIOVASCULAR: Negative for chest pain. Negative for palpitations and leg swelling.   GI: Negative for abdominal pain, No melena, diarrhea, nausea and vomiting.   : Negative for dysuria and frequency, Negative for hematuria  SKIN: Negative for bruising, Negative for edema or discoloration noted.   ENDOCRINE: Negative for polyphagia, Negative for heat intolerance, Negative for cold intolerance  PSYCHIATRIC: Negative for depression, Negative for anxiety, Negative for memory loss  MUSCULOSKELETAL: Negative for neck pain, Negative for muscle weakness, Negative for back pain     OBJECTIVE     VITAL SIGNS (Most Recent)  Temp: 97.2 °F (36.2 °C) (10/30/23 0707)  Pulse: (!) 51 (10/30/23 0707)  Resp: 18 (10/30/23 0707)  BP: (!) 113/56 (10/30/23 0707)  SpO2: 97 % (10/30/23 0707)    VENTILATION STATUS  Resp: 18 (10/30/23 0707)  SpO2: 97 % (10/30/23 0707)           I & O (Last 24H):No intake or output data in the 24 hours ending 10/30/23 0851    WEIGHTS  Wt Readings from Last 1 Encounters:   10/30/23 0453 79.9 kg (176 lb 3.2 oz)   10/30/23 0119 80.7 kg (178 lb)       PHYSICAL EXAM  GENERAL: well built, well nourished, well-developed in no apparent distress alert and oriented.   HEENT: Normocephalic. Pupils normal and conjunctivae normal.  Mucous membranes normal, no cyanosis or icterus, trachea central,no pallor or icterus is noted..   NECK: No JVD. No bruit..   THYROID: Thyroid not enlarged. No nodules present..   CARDIAC: Regular rate and rhythm. S1 is normal.S2 is normal.No gallops, clicks or murmurs noted at this time.  CHEST ANATOMY: normal.   LUNGS: Clear to auscultation. No wheezing or rhonchi..   ABDOMEN: Soft no masses or organomegaly.  No abdomen pulsations or bruits.  Normal bowel sounds. No pulsations and no masses felt, No guarding or rebound.   URINARY: No lópez catheter    EXTREMITIES: No cyanosis, clubbing or edema noted at this time., no calf tenderness bilaterally.   PERIPHERAL VASCULAR SYSTEM: Good palpable distal pulses.   CENTRAL NERVOUS SYSTEM: No focal motor or sensory deficits noted.   SKIN: Skin without lesions, moist, well perfused.   MUSCLE STRENGTH & TONE: No noteable weakness, atrophy or abnormal movement.     HOME MEDICATIONS:  No current facility-administered medications on file prior to encounter.     Current Outpatient Medications on File Prior to Encounter   Medication Sig Dispense Refill    apixaban (ELIQUIS) 5 mg Tab Take 1 tablet (5 mg total) by mouth 2 (two) times daily. 60 tablet 2    atorvastatin (LIPITOR) 40 MG tablet Take 40 mg by mouth once daily.      levothyroxine (SYNTHROID) 25 MCG tablet Take 25 mcg by mouth before breakfast.      lisinopriL-hydrochlorothiazide (PRINZIDE,ZESTORETIC) 20-25 mg Tab Take 1 tablet by mouth once daily.      NIFEdipine (ADALAT CC) 30 MG TbSR Take 30 mg by mouth once daily.      calcium carbonate-vit D3-min (CALCIUM 600 + MINERALS) 600 mg calcium- 200 unit Tab Take by mouth once.      CALCIUM CITRATE ORAL Take 1 tablet by mouth once daily.      calcium citrate-vitamin D3 315-200 mg (CITRACAL+D) 315 mg-5 mcg (200 unit) per tablet Take 2 tablets by mouth once daily.      cetirizine (ZYRTEC) 10 MG tablet Take 10 mg by mouth once daily.      fluticasone propionate (FLONASE) 50 mcg/actuation nasal spray INSTILL 1 SPRAY IN EACH NOSTRIL TWICE A DAY FOR ALLERGIES      multivitamin (ONE DAILY MULTIVITAMIN) per tablet Take 1 tablet by mouth once daily.      predniSONE (DELTASONE) 20 MG tablet Take 20 mg by mouth once daily.      sildenafiL (VIAGRA) 100 MG tablet 50 mg.         SCHEDULED MEDS:   apixaban  5 mg Oral BID    atorvastatin  40 mg Oral Daily    calcium-vitamin D3  1 tablet Oral Daily    fluticasone propionate  2 spray Each Nostril Daily    lisinopriL  20 mg Oral Daily    And    hydroCHLOROthiazide  25 mg Oral Daily     "levothyroxine  25 mcg Oral Before breakfast    NIFEdipine  30 mg Oral Daily       CONTINUOUS INFUSIONS:    PRN MEDS:acetaminophen, sodium chloride 0.9%    LABS AND DIAGNOSTICS     CBC LAST 3 DAYS  Recent Labs   Lab 10/30/23  0122 10/30/23  0307   WBC 5.88 4.64   RBC 4.50* 4.43*   HGB 13.7* 13.3*   HCT 40.1 39.3*   MCV 89 89   MCH 30.4 30.0   MCHC 34.2 33.8   RDW 12.8 12.6    347   MPV 10.5 10.9   GRAN 36.3*  2.1 45.7  2.1   LYMPH 47.4  2.8 37.5  1.7   MONO 11.1  0.7 10.8  0.5   BASO 0.04 0.03   NRBC 0 0       COAGULATION LAST 3 DAYS  Recent Labs   Lab 10/30/23  0122   INR 1.1       CHEMISTRY LAST 3 DAYS  Recent Labs   Lab 10/30/23  0122      K 3.5      CO2 27   ANIONGAP 7*   BUN 8   CREATININE 1.1   GLU 98   CALCIUM 9.4   ALBUMIN 4.5   PROT 7.4   ALKPHOS 43*   ALT 15   AST 16   BILITOT 0.7       CARDIAC PROFILE LAST 3 DAYS  Recent Labs   Lab 10/30/23  0122 10/30/23  0307   BNP 16  --    TROPONINIHS 6.2 6.5       ENDOCRINE LAST 3 DAYS  No results for input(s): "TSH", "PROCAL" in the last 168 hours.    LAST ARTERIAL BLOOD GAS  ABG  No results for input(s): "PH", "PO2", "PCO2", "HCO3", "BE" in the last 168 hours.    LAST 7 DAYS MICROBIOLOGY   Microbiology Results (last 7 days)       ** No results found for the last 168 hours. **            MOST RECENT IMAGING  X-Ray Chest AP Portable  HISTORY: chest pain    FINDINGS: Portable chest radiograph at 0136 hours compared to prior exams shows the cardiomediastinal silhouette and pulmonary vasculature are within normal limits.    The lungs are normally expanded, with no consolidation, large pleural effusion, or evidence of pulmonary edema. There are lucencies in the upper lungs reflecting emphysema, with no pneumothorax or acute fracture.    IMPRESSION: No evidence of acute cardiopulmonary disease.    Electronically signed by:  Guido Cannon MD  10/30/2023 07:27 AM CDT Workstation: [id number]      ECHOCARDIOGRAM RESULTS (last 5)  No results found for " this or any previous visit.      CURRENT/PREVIOUS VISIT EKG  Results for orders placed or performed during the hospital encounter of 10/30/23   EKG 12-lead    Collection Time: 10/30/23  1:18 AM    Narrative    Test Reason : R07.9,    Vent. Rate : 071 BPM     Atrial Rate : 071 BPM     P-R Int : 186 ms          QRS Dur : 078 ms      QT Int : 382 ms       P-R-T Axes : 060 030 048 degrees     QTc Int : 415 ms    Normal sinus rhythm  Normal ECG  When compared with ECG of 06-AUG-2023 09:16,  T wave inversion no longer evident in Inferior leads    Referred By: [email or web address]   SELF           Confirmed By:            ASSESSMENT/PLAN:     There are no active hospital problems to display for this patient.      ASSESSMENT & PLAN:     Chest pain   Uncontrolled HTN  PE on Eliquis  GERD  Hyperlipidemia       RECOMMENDATIONS:    Nuclear stress test is negative for reversible ischemia.  Patient is now free of chest discomfort.  Echo pending.   Hold Eliquis for now until stress results are in.   BP improved on current regimen. Continue lisinopril 20 mg with hydrochlorothiazide 25 mg p.o. daily and nifedipine 30 mg p.o. daily.  Add clonidine 0.1 mg q.8 hours p.r.n. for systolic blood pressure greater than 170.  Recommend the patient to follow up with primary care as well as Cardiology outpatient.  [facility]  Stable for discharge from cardiac standpoint.        Val Thompson NP  Date of Service: 10/30/2023  8:51 AM     I have personally interviewed and examined the patient. I have reviewed the Nurse Practitioner's history and physical, assessment, and plan. I agree with the findings and made appropriate changes as necessary in recommendations.    1. Patient presented with uncontrolled hypertension and had some chest tightness he underwent myocardial perfusion study is negative for any reversible ischemia.  And is echocardiogram shows normal LV function.  2. Discussed with patient in regards  with watching his salt especially when he goes to restaurants.  And patient to be on clonidine 0.1 mg q.8 hours p.r.n. as needed if systolic blood pressure greater than 170.  3. Patient is scheduled to see a cardiologist at the [facility]      Aren Gonzalez MD  Department of Cardiology  [facility]  10/30/2023 8:51 AM

## 2023-10-30 NOTE — ASSESSMENT & PLAN NOTE
Nuclear stress test negative for reversible ischemia  ECHO completed  Cardiology following  Troponins trending.

## 2023-10-30 NOTE — H&P
Formerly [facility], [facility] - Emergency Dept    History & Physical      Patient Name: Rosemary Lee  MRN: [id number]  Admission Date: 10/30/2023  Attending Physician: Neftaly Kaur MD   Primary Care Provider: Uriel Clark NP         Patient information was obtained from patient, spouse/SO, and ER records.     Subjective:     Principal Problem:<principal problem not specified>    Chief Complaint:   Chief Complaint   Patient presents with    Chest Pain     C/o chest pain and htn        HPI:   Patient is 73 years old male with a past medical history COVID, acid reflux, PE on Eliquis, prostate cancer, hypertension, hyperlipidemia, who presents to hospital for high blood pressure at home.  The patient reports his baseline blood pressure around 130 but tonight was 180.  On the way to the emergency room, the patient developed substernal chest pain.  The chest pain was 5/10.  The pain was more like pressure pain.  Patient had some nausea but no vomiting.  When the patient arrived here his chest pain resolved.  Patient denied cough, fever, chill, vomiting    Past Medical History:   Diagnosis Date    COVID     DDD (degenerative disc disease), lumbosacral     GERD (gastroesophageal reflux disease)        Past Surgical History:   Procedure Laterality Date    SKIN BIOPSY         Review of patient's allergies indicates:   Allergen Reactions    Oxycodone-acetaminophen        No current facility-administered medications on file prior to encounter.     Current Outpatient Medications on File Prior to Encounter   Medication Sig    apixaban (ELIQUIS) 5 mg Tab Take 1 tablet (5 mg total) by mouth 2 (two) times daily.    atorvastatin (LIPITOR) 40 MG tablet Take 40 mg by mouth once daily.    calcium carbonate-vit D3-min (CALCIUM 600 + MINERALS) 600 mg calcium- 200 unit Tab Take by mouth once.    CALCIUM CITRATE ORAL Take 1 tablet by mouth once daily.    calcium citrate-vitamin D3 315-200 mg (CITRACAL+D) 315 mg-5 mcg  (200 unit) per tablet Take 2 tablets by mouth once daily.    cetirizine (ZYRTEC) 10 MG tablet Take 10 mg by mouth once daily.    fluticasone propionate (FLONASE) 50 mcg/actuation nasal spray INSTILL 1 SPRAY IN EACH NOSTRIL TWICE A DAY FOR ALLERGIES    levothyroxine (SYNTHROID) 25 MCG tablet Take 25 mcg by mouth before breakfast.    lisinopriL-hydrochlorothiazide (PRINZIDE,ZESTORETIC) 20-25 mg Tab Take 1 tablet by mouth once daily.    multivitamin (ONE DAILY MULTIVITAMIN) per tablet Take 1 tablet by mouth once daily.    NIFEdipine (ADALAT CC) 30 MG TbSR Take 30 mg by mouth once daily.    predniSONE (DELTASONE) 20 MG tablet Take 20 mg by mouth once daily.    sildenafiL (VIAGRA) 100 MG tablet 50 mg.     Family History       Problem Relation (Age of Onset)    Hypertension Mother          Tobacco Use    Smoking status: Former     Types: Cigarettes    Smokeless tobacco: Never   Substance and Sexual Activity    Alcohol use: Yes    Drug use: Yes     Types: Marijuana    Sexual activity: Yes     Partners: Female     Review of Systems   Constitutional:  Negative for chills and fever.   HENT:  Negative for hearing loss and tinnitus.    Respiratory:  Positive for shortness of breath. Negative for wheezing.    Cardiovascular:  Positive for chest pain. Negative for palpitations.   Gastrointestinal:  Negative for nausea and vomiting.   Musculoskeletal:  Negative for back pain and neck pain.   Skin:  Negative for rash.   Neurological:  Negative for dizziness and headaches.   Psychiatric/Behavioral:  Negative for hallucinations. The patient is not nervous/anxious.    All other systems reviewed and are negative.    Objective:     Vital Signs (Most Recent):  Temp: 98.3 °F (36.8 °C) (10/30/23 0120)  Pulse: 64 (10/30/23 0200)  Resp: 16 (10/30/23 0200)  BP: 126/60 (10/30/23 0200)  SpO2: 96 % (10/30/23 0200) Vital Signs (24h Range):  Temp:  [98.3 °F (36.8 °C)] 98.3 °F (36.8 °C)  Pulse:  [64-69] 64  Resp:  [16-20] 16  SpO2:  [95 %-96 %] 96  %  BP: (126-183)/(60-80) 126/60     Weight: 80.7 kg (178 lb)  Body mass index is 24.14 kg/m².    Physical Exam  Constitutional:       General: He is not in acute distress.  HENT:      Head: Normocephalic and atraumatic.      Nose: No congestion.   Eyes:      General: No scleral icterus.        Right eye: No discharge.         Left eye: No discharge.      Extraocular Movements: Extraocular movements intact.   Cardiovascular:      Rate and Rhythm: Normal rate and regular rhythm.   Pulmonary:      Effort: Pulmonary effort is normal.      Breath sounds: Normal breath sounds.   Abdominal:      General: Abdomen is flat. Bowel sounds are normal.   Musculoskeletal:         General: No swelling or tenderness.      Cervical back: Normal range of motion and neck supple. No rigidity.   Skin:     General: Skin is warm.   Neurological:      General: No focal deficit present.      Mental Status: He is alert and oriented to person, place, and time.   Psychiatric:         Mood and Affect: Mood normal.            Significant Labs: All pertinent labs within the past 24 hours have been reviewed.  CBC:   Recent Labs   Lab 10/30/23  0122   WBC 5.88   HGB 13.7*   HCT 40.1        CMP:   Recent Labs   Lab 10/30/23  0122      K 3.5      CO2 27   GLU 98   BUN 8   CREATININE 1.1   CALCIUM 9.4   PROT 7.4   ALBUMIN 4.5   BILITOT 0.7   ALKPHOS 43*   AST 16   ALT 15   ANIONGAP 7*       Significant Imaging: I have reviewed all pertinent imaging results/findings within the past 24 hours.    Assessment/Plan:     Chest pain  We will get cardiac enzyme and troponin.  We will order stress test in the morning and check echo.    Uncontrolled hypertension  His blood pressure is stable now.  Resume his home medication    History of PE  Continue Eliquis    History of chronic anemia  Unknown etiology    History of prostate cancer  Advised patient follow up with his oncology    DVT and GI prophylactic  VTE Risk Mitigation (From admission,  onward)      None              Eze De Paz MD  Department of Hospital Medicine   [facility] - Emergency Dept

## 2023-10-30 NOTE — PLAN OF CARE
Pt at stress. KRAUS explained to Pt's spouse (Margie Kaur (Spouse) [phone] (Mobile)). Pt's spouse declined to sign, verbalizing she is visually impaired and does not feel comfortable signing. Pt's spouse declined giving verbal citing her discomfort.       10/30/23 1128   KRAUS Message   Medicare Outpatient and Observation Notification regarding financial responsibility Given to patient/caregiver;Explained to patient/caregiver;Other (comments)  (Pt's spouse declined to sign)   Date KRASU was signed 10/30/23   Time KRAUS was signed [id number]

## 2023-10-30 NOTE — PLAN OF CARE
Patient cleared for discharge from case management standpoint.    Follow up appointments scheduled and added to AVS.    Chart and discharge orders reviewed.  Patient discharged home with no further case management needs. Pt's discharge pending clearance by cardiology.       10/30/23 1148   Final Note   Assessment Type Final Discharge Note   Anticipated Discharge Disposition Home   What phone number can be called within the next 1-3 days to see how you are doing after discharge? [phone]   Hospital Resources/Appts/Education Provided Provided patient/caregiver with written discharge plan information;Appointments scheduled and added to AVS;Post-Acute resouces added to AVS   Post-Acute Status   Coverage Payor:  MEDICARE - MEDICARE PART A & B   Discharge Delays (!) Procedure Scheduling (IR, OR, Labs, Echo, Cath, Echo, EEG)

## 2023-10-30 NOTE — DISCHARGE SUMMARY
[facility] Medicine  Discharge Summary      Patient Name: Rosemary Lee  MRN: [id number]  [facility]: [id number]  Patient Class: OP- Observation  Admission Date: 10/30/2023  Hospital Length of Stay: 0 days  Discharge Date and Time:  10/30/2023 3:50 PM  Attending Physician: No att. providers found   Discharging Provider: Marbella Bradley DNP  Primary Care Provider: Uriel Clark NP    Primary Care Team: Networked reference to record PCT     HPI:   Patient is 73 years old male with a past medical history COVID, acid reflux, PE on Eliquis, prostate cancer, hypertension, hyperlipidemia, who presents to hospital for high blood pressure at home.  The patient reports his baseline blood pressure around 130 but tonight was 180.  On the way to the emergency room, the patient developed substernal chest pain.  The chest pain was 5/10.  The pain was more like pressure pain.  Patient had some nausea but no vomiting.  When the patient arrived here his chest pain resolved.  Patient denied cough, fever, chill, vomiting       Hospital Course:   Patient assessed and examined. No acute distress noted. Denies any chest pain or shortness of breath. ECHO completed. Nuclear med stress test negative for reversible ischemia. He is sitting on side of bed eating with wife at bedside. Cardiology ok for discharge home with prn clonidine for SBP > 170 and continue previous home blood pressure regimen. Patient and wife verbalize understanding. [facility] reports  [facility] . Patient assessed and deemed appropriated for discharge.        Goals of Care Treatment Preferences:  Code Status: Full Code      Consults:   Consults (From admission, onward)        Status Ordering Provider     Inpatient consult to Cardiology  Once        Provider:  Erika Hartman MD    Completed ERIKA HARTMAN     IP consult to case management  Once        Provider:  (Not yet assigned)     "Completed ERIKA HARTMAN          Cardiac/Vascular  * Chest pain  Nuclear stress test negative for reversible ischemia  ECHO completed  Cardiology following  Troponins trending.      Final Active Diagnoses:    Diagnosis Date Noted POA    PRINCIPAL PROBLEM:  Chest pain [R07.9] 10/30/2023 Yes      Problems Resolved During this Admission:       Discharged Condition: good    Disposition: Home or Self Care    Follow Up:   Follow-up Information     Uriel Clark NP. Go on 11/9/2023.    Specialty: Family Medicine  Why: Please see your primary care provider Thursday, 11/09/2023, at 1:00pm for hospital follow-up.  Contact information:  [address] [place] [address]  [address] [place] [id number]  [phone]             [facility] Go to.    Why: [facility]                     Patient Instructions:      Diet Cardiac     Notify your health care provider if you experience any of the following:  temperature >100.4     Notify your health care provider if you experience any of the following:  persistent nausea and vomiting or diarrhea     Notify your health care provider if you experience any of the following:  severe uncontrolled pain     Notify your health care provider if you experience any of the following:  difficulty breathing or increased cough     Notify your health care provider if you experience any of the following:  persistent dizziness, light-headedness, or visual disturbances     Activity as tolerated   Order Comments: Fall Precautions       Significant Diagnostic Studies: Labs:   CMP   Recent Labs   Lab 10/30/23  0122      K 3.5      CO2 27   GLU 98   BUN 8   CREATININE 1.1   CALCIUM 9.4   PROT 7.4   ALBUMIN 4.5   BILITOT 0.7   ALKPHOS 43*   AST 16   ALT 15   ANIONGAP 7*   , CBC   Recent Labs   Lab 10/30/23  0122 10/30/23  0307   WBC 5.88 4.64   HGB 13.7* 13.3*   HCT 40.1 39.3*    347    and Troponin No results for input(s): "TROPONINI" in the last 168 " hours.    Pending Diagnostic Studies:     Procedure Component Value Units Date/Time    Troponin I High Sensitivity [[id number]]     Order Status: Sent Lab Status: No result     Specimen: Blood          Medications:  Reconciled Home Medications:      Medication List      START taking these medications    cloNIDine 0.1 MG tablet  Commonly known as: CATAPRES  Take 1 tablet (0.1 mg total) by mouth every 8 (eight) hours as needed (Systolic Blood Pressure > 170).        CONTINUE taking these medications    apixaban 5 mg Tab  Commonly known as: ELIQUIS  Take 1 tablet (5 mg total) by mouth 2 (two) times daily.     atorvastatin 40 MG tablet  Commonly known as: LIPITOR  Take 40 mg by mouth once daily.     CALCIUM 600 + MINERALS 600 mg calcium- 200 unit Tab  Generic drug: calcium carbonate-vit D3-min  Take by mouth once.     CALCIUM CITRATE ORAL  Take 1 tablet by mouth once daily.     calcium citrate-vitamin D3 315-200 mg 315 mg-5 mcg (200 unit) per tablet  Commonly known as: CITRACAL+D  Take 2 tablets by mouth once daily.     cetirizine 10 MG tablet  Commonly known as: ZYRTEC  Take 10 mg by mouth once daily.     fluticasone propionate 50 mcg/actuation nasal spray  Commonly known as: FLONASE  INSTILL 1 SPRAY IN EACH NOSTRIL TWICE A DAY FOR ALLERGIES     levothyroxine 25 MCG tablet  Commonly known as: SYNTHROID  Take 25 mcg by mouth before breakfast.     lisinopriL-hydrochlorothiazide 20-25 mg Tab  Commonly known as: PRINZIDE,ZESTORETIC  Take 1 tablet by mouth once daily.     NIFEdipine 30 MG Tbsr  Commonly known as: ADALAT CC  Take 30 mg by mouth once daily.     ONE DAILY MULTIVITAMIN per tablet  Generic drug: multivitamin  Take 1 tablet by mouth once daily.     predniSONE 20 MG tablet  Commonly known as: DELTASONE  Take 20 mg by mouth once daily.     sildenafiL 100 MG tablet  Commonly known as: VIAGRA  50 mg.            Indwelling Lines/Drains at time of discharge:   Lines/Drains/Airways     None                 Time spent on  the discharge of patient: 33 minutes        Marbella Bradley DNP  Department of Hospital Medicine  [facility]

## 2023-10-30 NOTE — SUBJECTIVE & OBJECTIVE
Interval History: Patient assessed and examined. No events overnight. No acute distress noted. Returned from stress test. Sitting on side of bed eating. Wife at bedside. Denies any chest pain or shortness of breath. Echo completed. Stress test negative for any reversible ischemia. Cardiology consult completed and ok with discharge. Discharge home to continue previous blood pressure regimen and added on prn clonidine for SBP > 170. Patient verbalize understanding of clonidine discharge medication. [facility] Patient examined and deemed appropriate for discharge.        Review of Systems  Objective:     Vital Signs (Most Recent):  Temp: 97.8 °F (36.6 °C) (10/30/23 1205)  Pulse: 60 (10/30/23 1205)  Resp: 18 (10/30/23 1205)  BP: 117/62 (10/30/23 1205)  SpO2: 99 % (10/30/23 1205) Vital Signs (24h Range):  Temp:  [97.2 °F (36.2 °C)-98.3 °F (36.8 °C)] 97.8 °F (36.6 °C)  Pulse:  [51-69] 60  Resp:  [16-20] 18  SpO2:  [95 %-99 %] 99 %  BP: (113-183)/(56-80) 117/62     Weight: 79.9 kg (176 lb 3.2 oz)  Body mass index is 23.9 kg/m².    Intake/Output Summary (Last 24 hours) at 10/30/2023 1436  Last data filed at 10/30/2023 0859  Gross per 24 hour   Intake --   Output 600 ml   Net -600 ml         Physical Exam        Significant Labs: All pertinent labs within the past 24 hours have been reviewed.  BMP:   Recent Labs   Lab 10/30/23  0122   GLU 98      K 3.5      CO2 27   BUN 8   CREATININE 1.1   CALCIUM 9.4     CBC:   Recent Labs   Lab 10/30/23  0122 10/30/23  0307   WBC 5.88 4.64   HGB 13.7* 13.3*   HCT 40.1 39.3*    347     Troponin:   Recent Labs   Lab 10/30/23  0122 10/30/23  0307 10/30/23  1226   TROPONINIHS 6.2 6.5 6.3       Significant Imaging: I have reviewed all pertinent imaging results/findings within the past 24 hours.    X-Ray Chest AP Portable  Order: [id number]  Status: Final result     Visible to patient: Yes (not seen)     Next  appt: 11/09/2023 at 01:00 PM in Family Medicine (Uriel Clark NP)     0 Result Notes  Details    Reading Physician Reading Date Result Priority   Guido Cannon MD  [phone] 10/30/2023      Narrative & Impression  HISTORY: chest pain     FINDINGS: Portable chest radiograph at 0136 hours compared to prior exams shows the cardiomediastinal silhouette and pulmonary vasculature are within normal limits.     The lungs are normally expanded, with no consolidation, large pleural effusion, or evidence of pulmonary edema. There are lucencies in the upper lungs reflecting emphysema, with no pneumothorax or acute fracture.     IMPRESSION: No evidence of acute cardiopulmonary disease.     Electronically signed by:  Guido Cannon MD  10/30/2023 07:27 AM CDT Workstation: [id number]       NM Myocardial Perfusion Spect Multi Pharmacologic  Order: [id number]  Status: Final result     Visible to patient: Yes (not seen)     Next appt: 11/09/2023 at 01:00 PM in Family Medicine (Uriel Clark NP)     0 Result Notes  Details    Reading Physician Reading Date Result Priority   Guido Cannon MD  [phone] 10/30/2023      Narrative & Impression  HISTORY: Chest pain/anginal equiv, intermediate CAD risk, not treadmill candidate     TECHNIQUE:  11 mCi technetium 99m Myoview was administered IV for the rest portion of the exam, with 23.2 mCi for the stress portion of the exam, following a 1 day protocol.  The patient was stressed with Lexiscan 0.4 mg IV, and achieved a maximum heart rate of 92 beats per minute.     FINDINGS:  No prior studies for comparison. There is normal and homogeneous radiotracer uptake by the left ventricular myocardium, with no photopenic defects to suggest pharmacologically induced reversible ischemia or infarct.  There are no wall motion abnormalities on the gated cine images, with no abnormal transient left ventricular dilatation on stress images.     Ejection fraction is calculated at 76%.   The polar map images confirm the planar SPECT findings.     IMPRESSION:  1. No evidence of pharmacologically induced reversible ischemia or infarct.  2. Normal left ventricular wall motion.  3. Calculated ejection fraction of 76%.     Electronically signed by:  Guido Cannon MD  10/30/2023 12:14 PM CDT Workstation: [id number]

## 2023-10-30 NOTE — HOSPITAL COURSE
Patient assessed and examined. No acute distress noted. Denies any chest pain or shortness of breath. ECHO completed. Nuclear med stress test negative for reversible ischemia. He is sitting on side of bed eating with wife at bedside. Cardiology ok for discharge home with prn clonidine for SBP > 170 and continue previous home blood pressure regimen. Patient and wife verbalize understanding. He reports  [facility] . Patient assessed and deemed appropriated for discharge.

## 2023-10-30 NOTE — DISCHARGE INSTRUCTIONS
Clonidine 0.1 mg by mouth every 8 hours as needed for systolic blood pressure greater than 170.

## 2023-10-31 LAB
CV PHARM DOSE: 0.4 MG
CV STRESS BASE HR: 56 BPM
DIASTOLIC BLOOD PRESSURE: 66 MMHG
OHS CV CPX 1 MINUTE RECOVERY HEART RATE: 74 BPM
OHS CV CPX 85 PERCENT MAX PREDICTED HEART RATE MALE: 124
OHS CV CPX MAX PREDICTED HEART RATE: 146
OHS CV CPX PATIENT IS FEMALE: 0
OHS CV CPX PATIENT IS MALE: 1
OHS CV CPX PEAK DIASTOLIC BLOOD PRESSURE: 73 MMHG
OHS CV CPX PEAK HEAR RATE: 99 BPM
OHS CV CPX PEAK RATE PRESSURE PRODUCT: NORMAL
OHS CV CPX PEAK SYSTOLIC BLOOD PRESSURE: 139 MMHG
OHS CV CPX PERCENT MAX PREDICTED HEART RATE ACHIEVED: 68
OHS CV CPX RATE PRESSURE PRODUCT PRESENTING: 6832
SYSTOLIC BLOOD PRESSURE: 122 MMHG

## 2023-11-06 PROBLEM — I26.99 ACUTE PULMONARY EMBOLISM: Status: RESOLVED | Noted: 2023-08-06 | Resolved: 2023-11-06

## 2023-11-09 ENCOUNTER — OFFICE VISIT (OUTPATIENT)
Dept: FAMILY MEDICINE | Facility: CLINIC | Age: 73
End: 2023-11-09
Payer: MEDICARE

## 2023-11-09 VITALS
BODY MASS INDEX: 24.11 KG/M2 | TEMPERATURE: 98 F | WEIGHT: 178 LBS | OXYGEN SATURATION: 98 % | SYSTOLIC BLOOD PRESSURE: 122 MMHG | DIASTOLIC BLOOD PRESSURE: 68 MMHG | HEIGHT: 72 IN | HEART RATE: 89 BPM

## 2023-11-09 DIAGNOSIS — R07.9 CHEST PAIN, UNSPECIFIED TYPE: ICD-10-CM

## 2023-11-09 DIAGNOSIS — Z09 HOSPITAL DISCHARGE FOLLOW-UP: Primary | ICD-10-CM

## 2023-11-09 DIAGNOSIS — I10 BENIGN ESSENTIAL HYPERTENSION: ICD-10-CM

## 2023-11-09 PROCEDURE — 99214 PR OFFICE/OUTPT VISIT, EST, LEVL IV, 30-39 MIN: ICD-10-PCS | Mod: S$PBB,,, | Performed by: NURSE PRACTITIONER

## 2023-11-09 PROCEDURE — 99214 OFFICE O/P EST MOD 30 MIN: CPT | Mod: S$PBB,,, | Performed by: NURSE PRACTITIONER

## 2023-11-09 PROCEDURE — 99214 OFFICE O/P EST MOD 30 MIN: CPT | Performed by: NURSE PRACTITIONER

## 2023-11-09 NOTE — PROGRESS NOTES
SUBJECTIVE:      Patient ID: Rosemary Lee is a 73 y.o. male.    Chief Complaint: Follow-up and Chest Pain    73-year-old male presents to the clinic for hospital follow-up.     HPI:   Patient is 73 years old male with a past medical history COVID, acid reflux, PE on Eliquis, prostate cancer, hypertension, hyperlipidemia, who presents to hospital for high blood pressure at home.  The patient reports his baseline blood pressure around 130 but tonight was 180.  On the way to the emergency room, the patient developed substernal chest pain.  The chest pain was 5/10.  The pain was more like pressure pain.  Patient had some nausea but no vomiting.  When the patient arrived here his chest pain resolved.  Patient denied cough, fever, chill, vomiting     Hospital Course:   Patient assessed and examined. No acute distress noted. Denies any chest pain or shortness of breath. ECHO completed. Nuclear med stress test negative for reversible ischemia. He is sitting on side of bed eating with wife at bedside. Cardiology ok for discharge home with prn clonidine for SBP > 170 and continue previous home blood pressure regimen. Patient and wife verbalize understanding. [facility] . Patient assessed and deemed appropriated for discharge.     He has been doing well since discharge.  He picked up the Clonidine 0.1 MG tablet every 8 (eight) hours as needed (Systolic Blood Pressure > 170). He has not needed to take the clonidine. Blood pressures have been stable. Denies chest pain since being discharged.  He is doing well today and offers no new complaints.         Family History   Problem Relation Age of Onset    Hypertension Mother       Social History     Socioeconomic History    Marital status:    Tobacco Use    Smoking status: Former     Types: Cigarettes    Smokeless tobacco: Never   Substance and Sexual Activity    Alcohol use: Yes    Drug use: Yes      Types: Marijuana    Sexual activity: Yes     Partners: Female     Current Outpatient Medications   Medication Sig Dispense Refill    atorvastatin (LIPITOR) 40 MG tablet Take 40 mg by mouth once daily.      calcium carbonate-vit D3-min (CALCIUM 600 + MINERALS) 600 mg calcium- 200 unit Tab Take by mouth once.      CALCIUM CITRATE ORAL Take 1 tablet by mouth once daily.      calcium citrate-vitamin D3 315-200 mg (CITRACAL+D) 315 mg-5 mcg (200 unit) per tablet Take 2 tablets by mouth once daily.      cetirizine (ZYRTEC) 10 MG tablet Take 10 mg by mouth once daily.      cloNIDine (CATAPRES) 0.1 MG tablet Take 1 tablet (0.1 mg total) by mouth every 8 (eight) hours as needed (Systolic Blood Pressure > 170). 60 tablet 0    fluticasone propionate (FLONASE) 50 mcg/actuation nasal spray INSTILL 1 SPRAY IN EACH NOSTRIL TWICE A DAY FOR ALLERGIES      levothyroxine (SYNTHROID) 25 MCG tablet Take 25 mcg by mouth before breakfast.      lisinopriL-hydrochlorothiazide (PRINZIDE,ZESTORETIC) 20-25 mg Tab Take 1 tablet by mouth once daily.      multivitamin (ONE DAILY MULTIVITAMIN) per tablet Take 1 tablet by mouth once daily.      NIFEdipine (ADALAT CC) 30 MG TbSR Take 30 mg by mouth once daily.      predniSONE (DELTASONE) 20 MG tablet Take 20 mg by mouth once daily.      sildenafiL (VIAGRA) 100 MG tablet 50 mg.       No current facility-administered medications for this visit.     Review of patient's allergies indicates:   Allergen Reactions    Oxycodone-acetaminophen       Past Medical History:   Diagnosis Date    COVID     DDD (degenerative disc disease), lumbosacral     GERD (gastroesophageal reflux disease)      Past Surgical History:   Procedure Laterality Date    SKIN BIOPSY         Review of Systems   Constitutional:  Negative for activity change, appetite change, chills, diaphoresis, fatigue, fever and unexpected weight change.   HENT:  Negative for congestion, ear pain, sinus pressure, sore throat, trouble swallowing and  voice change.    Eyes:  Negative for pain, discharge and visual disturbance.   Respiratory:  Negative for cough, chest tightness, shortness of breath and wheezing.         PE   Cardiovascular:  Positive for chest pain. Negative for palpitations.        Hypertension and hyperlipoidemia    Gastrointestinal:  Negative for abdominal pain, constipation, diarrhea, nausea and vomiting.   Endocrine:        Hypothyroidism   Genitourinary:  Negative for difficulty urinating, flank pain, frequency and urgency.        Prostate cancer, in remission   Musculoskeletal:  Negative for back pain and joint swelling.   Skin:  Negative for color change and rash.   Neurological:  Negative for dizziness, seizures, syncope, weakness, numbness and headaches.   Hematological:  Negative for adenopathy.   Psychiatric/Behavioral:  Negative for dysphoric mood and sleep disturbance. The patient is not nervous/anxious.       OBJECTIVE:      Vitals:    11/09/23 1256   BP: 122/68   BP Location: Left arm   Patient Position: Sitting   BP Method: Medium (Manual)   Pulse: 89   Temp: 97.6 °F (36.4 °C)   TempSrc: Oral   SpO2: 98%   Weight: 80.7 kg (178 lb)   Height: 6' (1.829 m)     Physical Exam  Vitals and nursing note reviewed.   Constitutional:       General: He is awake. He is not in acute distress.     Appearance: Normal appearance. He is well-developed, well-groomed and normal weight. He is not ill-appearing, toxic-appearing or diaphoretic.   HENT:      Head: Normocephalic and atraumatic.      Right Ear: Tympanic membrane, ear canal and external ear normal.      Left Ear: Tympanic membrane, ear canal and external ear normal.      Nose: Nose normal.      Mouth/Throat:      Lips: Pink.      Mouth: Mucous membranes are moist.      Dentition: No dental tenderness.      Tongue: Tongue does not deviate from midline.      Pharynx: Oropharynx is clear. Uvula midline. No pharyngeal swelling, oropharyngeal exudate, posterior oropharyngeal erythema or uvula  swelling.   Eyes:      General: Lids are normal. Gaze aligned appropriately.      Conjunctiva/sclera: Conjunctivae normal.      Right eye: Right conjunctiva is not injected.      Left eye: Left conjunctiva is not injected.      Pupils: Pupils are equal, round, and reactive to light.   Neck:      Thyroid: No thyromegaly or thyroid tenderness.   Cardiovascular:      Rate and Rhythm: Normal rate and regular rhythm.      Pulses: Normal pulses.      Heart sounds: Normal heart sounds, S1 normal and S2 normal. No murmur heard.     No friction rub. No gallop.   Pulmonary:      Effort: Pulmonary effort is normal. No respiratory distress.      Breath sounds: Normal breath sounds. No stridor. No decreased breath sounds, wheezing, rhonchi or rales.   Chest:      Chest wall: No tenderness.   Musculoskeletal:      Cervical back: Neck supple.      Right lower leg: No edema.      Left lower leg: No edema.   Lymphadenopathy:      Cervical: No cervical adenopathy.   Skin:     General: Skin is warm and dry.      Capillary Refill: Capillary refill takes less than 2 seconds.      Findings: No erythema or rash.   Neurological:      Mental Status: He is alert and oriented to person, place, and time. Mental status is at baseline.   Psychiatric:         Attention and Perception: Attention normal.         Mood and Affect: Mood normal.         Speech: Speech normal.         Behavior: Behavior normal. Behavior is cooperative.         Thought Content: Thought content normal.         Judgment: Judgment normal.        No visits with results within 1 Week(s) from this visit.   Latest known visit with results is:   Admission on 10/30/2023, Discharged on 10/30/2023   Component Date Value Ref Range Status    WBC 10/30/2023 5.88  3.90 - 12.70 K/uL Final    RBC 10/30/2023 4.50 (L)  4.60 - 6.20 M/uL Final    Hemoglobin 10/30/2023 13.7 (L)  14.0 - 18.0 g/dL Final    Hematocrit 10/30/2023 40.1  40.0 - 54.0 % Final    MCV 10/30/2023 89  82 - 98 fL Final     MCH 10/30/2023 30.4  27.0 - 31.0 pg Final    MCHC 10/30/2023 34.2  32.0 - 36.0 g/dL Final    RDW 10/30/2023 12.8  11.5 - 14.5 % Final    Platelets 10/30/2023 342  150 - 450 K/uL Final    MPV 10/30/2023 10.5  9.2 - 12.9 fL Final    Immature Granulocytes 10/30/2023 0.2  0.0 - 0.5 % Final    Gran # (ANC) 10/30/2023 2.1  1.8 - 7.7 K/uL Final    Immature Grans (Abs) 10/30/2023 0.01  0.00 - 0.04 K/uL Final    Comment: Mild elevation in immature granulocytes is non specific and   can be seen in a variety of conditions including stress response,   acute inflammation, trauma and pregnancy. Correlation with other   laboratory and clinical findings is essential.      Lymph # 10/30/2023 2.8  1.0 - 4.8 K/uL Final    Mono # 10/30/2023 0.7  0.3 - 1.0 K/uL Final    Eos # 10/30/2023 0.3  0.0 - 0.5 K/uL Final    Baso # 10/30/2023 0.04  0.00 - 0.20 K/uL Final    nRBC 10/30/2023 0  0 /100 WBC Final    Gran % 10/30/2023 36.3 (L)  38.0 - 73.0 % Final    Lymph % 10/30/2023 47.4  18.0 - 48.0 % Final    Mono % 10/30/2023 11.1  4.0 - 15.0 % Final    Eosinophil % 10/30/2023 4.3  0.0 - 8.0 % Final    Basophil % 10/30/2023 0.7  0.0 - 1.9 % Final    Differential Method 10/30/2023 Automated   Final    Sodium 10/30/2023 136  136 - 145 mmol/L Final    Potassium 10/30/2023 3.5  3.5 - 5.1 mmol/L Final    Chloride 10/30/2023 102  95 - 110 mmol/L Final    CO2 10/30/2023 27  23 - 29 mmol/L Final    Glucose 10/30/2023 98  70 - 110 mg/dL Final    BUN 10/30/2023 8  8 - 23 mg/dL Final    Creatinine 10/30/2023 1.1  0.5 - 1.4 mg/dL Final    Calcium 10/30/2023 9.4  8.7 - 10.5 mg/dL Final    Total Protein 10/30/2023 7.4  6.0 - 8.4 g/dL Final    Albumin 10/30/2023 4.5  3.5 - 5.2 g/dL Final    Total Bilirubin 10/30/2023 0.7  0.1 - 1.0 mg/dL Final    Comment: For infants and newborns, interpretation of results should be based  on gestational age, weight and in agreement with clinical  observations.    Premature Infant recommended reference ranges:  Up to 24  hours.............<8.0 mg/dL  Up to 48 hours............<12.0 mg/dL  3-5 days..................<15.0 mg/dL  6-29 days.................<15.0 mg/dL      Alkaline Phosphatase 10/30/2023 43 (L)  55 - 135 U/L Final    AST 10/30/2023 16  10 - 40 U/L Final    ALT 10/30/2023 15  10 - 44 U/L Final    eGFR 10/30/2023 >60.0  >60 mL/min/1.73 m^2 Final    Anion Gap 10/30/2023 7 (L)  8 - 16 mmol/L Final    BNP 10/30/2023 16  0 - 99 pg/mL Final    Values of less than 100 pg/ml are consistent with non-CHF populations.    Troponin I High Sensitivity 10/30/2023 6.2  0.0 - 14.9 pg/mL Final    Comment: Troponin results differ between methods. Do not use   results between Troponin methods interchangeably.    Alkaline Phospatase levels above 400 U/L may   cause false positive results.    Access hsTnI should not be used for patients taking   Asfotase chris (Strensiq).      Troponin I High Sensitivity 10/30/2023 6.5  0.0 - 14.9 pg/mL Final    Comment: Troponin results differ between methods. Do not use   results between Troponin methods interchangeably.    Alkaline Phospatase levels above 400 U/L may   cause false positive results.    Access hsTnI should not be used for patients taking   Asfotase chris (Strensiq).      Prothrombin Time 10/30/2023 12.4  9.0 - 12.5 sec Final    INR 10/30/2023 1.1  0.8 - 1.2 Final    Comment: Coumadin Therapy:  2.0 - 3.0 for INR for all indicators except mechanical heart valves  and antiphospholipid syndromes which should use 2.5 - 3.5.      WBC 10/30/2023 4.64  3.90 - 12.70 K/uL Final    RBC 10/30/2023 4.43 (L)  4.60 - 6.20 M/uL Final    Hemoglobin 10/30/2023 13.3 (L)  14.0 - 18.0 g/dL Final    Hematocrit 10/30/2023 39.3 (L)  40.0 - 54.0 % Final    MCV 10/30/2023 89  82 - 98 fL Final    MCH 10/30/2023 30.0  27.0 - 31.0 pg Final    MCHC 10/30/2023 33.8  32.0 - 36.0 g/dL Final    RDW 10/30/2023 12.6  11.5 - 14.5 % Final    Platelets 10/30/2023 347  150 - 450 K/uL Final    MPV 10/30/2023 10.9  9.2 - 12.9 fL Final     Immature Granulocytes 10/30/2023 0.2  0.0 - 0.5 % Final    Gran # (ANC) 10/30/2023 2.1  1.8 - 7.7 K/uL Final    Immature Grans (Abs) 10/30/2023 0.01  0.00 - 0.04 K/uL Final    Comment: Mild elevation in immature granulocytes is non specific and   can be seen in a variety of conditions including stress response,   acute inflammation, trauma and pregnancy. Correlation with other   laboratory and clinical findings is essential.      Lymph # 10/30/2023 1.7  1.0 - 4.8 K/uL Final    Mono # 10/30/2023 0.5  0.3 - 1.0 K/uL Final    Eos # 10/30/2023 0.2  0.0 - 0.5 K/uL Final    Baso # 10/30/2023 0.03  0.00 - 0.20 K/uL Final    nRBC 10/30/2023 0  0 /100 WBC Final    Gran % 10/30/2023 45.7  38.0 - 73.0 % Final    Lymph % 10/30/2023 37.5  18.0 - 48.0 % Final    Mono % 10/30/2023 10.8  4.0 - 15.0 % Final    Eosinophil % 10/30/2023 5.2  0.0 - 8.0 % Final    Basophil % 10/30/2023 0.6  0.0 - 1.9 % Final    Differential Method 10/30/2023 Automated   Final    BSA 10/30/2023 2.01  m2 Final    LVOT stroke volume 10/30/2023 62.31  cm3 Final    LVIDd 10/30/2023 4.21  3.5 - 6.0 cm Final    LV Systolic Volume 10/30/2023 35.00  mL Final    LV Systolic Volume Index 10/30/2023 17.3  mL/m2 Final    LVIDs 10/30/2023 3.00  2.1 - 4.0 cm Final    LV Diastolic Volume 10/30/2023 79.00  mL Final    LV Diastolic Volume Index 10/30/2023 39.11  mL/m2 Final    IVS 10/30/2023 1.16 (A)  0.6 - 1.1 cm Final    LVOT diameter 10/30/2023 1.80  cm Final    LVOT area 10/30/2023 2.5  cm2 Final    FS 10/30/2023 29  28 - 44 % Final    Left Ventricle Relative Wall Thick* 10/30/2023 0.35  cm Final    Posterior Wall 10/30/2023 0.73  0.6 - 1.1 cm Final    LV mass 10/30/2023 127.37  g Final    LV Mass Index 10/30/2023 63  g/m2 Final    MV Peak E Geovani 10/30/2023 0.75  m/s Final    TDI LATERAL 10/30/2023 0.10  m/s Final    TDI SEPTAL 10/30/2023 0.08  m/s Final    E/E' ratio 10/30/2023 8.33  m/s Final    MV Peak A Geovani 10/30/2023 0.79  m/s Final    TR Max Geovani 10/30/2023  2.41  m/s Final    E/A ratio 10/30/2023 0.95   Final    E wave deceleration time 10/30/2023 204.00  msec Final    LV SEPTAL E/E' RATIO 10/30/2023 9.38  m/s Final    LV LATERAL E/E' RATIO 10/30/2023 7.50  m/s Final    LVOT peak rosalind 10/30/2023 0.98  m/s Final    Left Ventricular Outflow Tract Rose* 10/30/2023 0.60  cm/s Final    Left Ventricular Outflow Tract Rose* 10/30/2023 2.00  mmHg Final    RV S' 10/30/2023 16.40  cm/s Final    TAPSE 10/30/2023 3.07  cm Final    AV mean gradient 10/30/2023 3  mmHg Final    AV peak gradient 10/30/2023 5  mmHg Final    Ao peak rosalind 10/30/2023 1.15  m/s Final    Ao VTI 10/30/2023 29.80  cm Final    LVOT peak VTI 10/30/2023 24.50  cm Final    AV valve area 10/30/2023 2.09  cm² Final    AV Velocity Ratio 10/30/2023 0.85   Final    AV index (prosthetic) 10/30/2023 0.82   Final    RAVINDER by Velocity Ratio 10/30/2023 2.17  cm² Final    MV mean gradient 10/30/2023 1  mmHg Final    MV peak gradient 10/30/2023 3  mmHg Final    MV valve area by continuity eq 10/30/2023 2.08  cm2 Final    MV VTI 10/30/2023 30.0  cm Final    Triscuspid Valve Regurgitation Pea* 10/30/2023 23  mmHg Final    PV PEAK VELOCITY 10/30/2023 0.99  m/s Final    PV peak gradient 10/30/2023 4  mmHg Final    Pulmonary Valve Mean Velocity 10/30/2023 0.67  m/s Final    Ao root annulus 10/30/2023 2.90  cm Final    IVC diameter 10/30/2023 1.85  cm Final    Mean e' 10/30/2023 0.09  m/s Final    ZLVIDS 10/30/2023 -1.55   Final    ZLVIDD 10/30/2023 -3.46   Final    AORTIC VALVE CUSP SEPERATION 10/30/2023 1.80  cm Final    Troponin I High Sensitivity 10/30/2023 6.3  0.0 - 14.9 pg/mL Final    Comment: Troponin results differ between methods. Do not use   results between Troponin methods interchangeably.    Alkaline Phospatase levels above 400 U/L may   cause false positive results.    Access hsTnI should not be used for patients taking   Asfotase chris (Strensiq).      85% Max Predicted HR 10/30/2023 124   Final    Max Predicted HR  10/30/2023 146   Final    [facility] CV CPX PATIENT IS MALE 10/30/2023 1.0   Final    [facility] CV CPX PATIENT IS FEMALE 10/30/2023 0.0   Final    Systolic blood pressure 10/30/2023 122  mmHg Final    Diastolic blood pressure 10/30/2023 66  mmHg Final    HR at rest 10/30/2023 56  bpm Final    dose 10/30/2023 0.4  mg Final    Peak Systolic BP 10/30/2023 139  mmHg Final    Peak Diatolic BP 10/30/2023 73  mmHg Final    Peak HR 10/30/2023 99.0  bpm Final    % Max HR Achieved 10/30/2023 68   Final    1 Minute Recovery HR 10/30/2023 74  bpm Final    RPP 10/30/2023 6,832   Final    Peak RPP 10/30/2023 13,761   Final     Assessment:       1. Hospital discharge follow-up    2. Benign essential hypertension    3. Chest pain, unspecified type        Plan:       Hospital discharge follow-up  Patient's hospital course was reviewed. Recommendations for follow up visits were discussed. Referrals placed if needed. Discharge and current medications have been reviewed and reconciled with the chart.    Benign essential hypertension  Stable, continue lisinopril-hctz 20-25 mg daily and nifedipine 30 mg daily. Take Clonidine 0.1 mg prn SBP >170. Continue home BP monitoring. Reduce the amount of salt in your diet; Lose weight; Avoid drinking too much alcohol; Exercise at least 30 minutes per day most days of the week.      Chest pain, unspecified type  Resolved, echo and stress test stable, follow-up with Cardiology next month as scheduled.     This note was created using [employer] voice recognition software that occasionally misinterprets phrases or words.     I spent a total of 20 minutes on the day of the visit.This includes face to face time and non-face to face time preparing to see the patient (eg, review of tests), obtaining and/or reviewing separately obtained history, documenting clinical information in the electronic or other health record, independently interpreting results and communicating results to the patient/family/caregiver, or care  coordinator.    Follow up for routine follow-up in December as scheduled.      11/9/2023 Uriel Clark, GM, FNP

## 2024-04-18 ENCOUNTER — LAB VISIT (OUTPATIENT)
Dept: LAB | Facility: HOSPITAL | Age: 74
End: 2024-04-18
Attending: UROLOGY
Payer: MEDICARE

## 2024-04-18 DIAGNOSIS — C61 CARCINOMA OF PROSTATE: ICD-10-CM

## 2024-04-18 LAB — COMPLEXED PSA SERPL-MCNC: 0.99 NG/ML (ref 0–4)

## 2024-04-18 PROCEDURE — 84153 ASSAY OF PSA TOTAL: CPT | Performed by: UROLOGY

## 2024-04-18 PROCEDURE — 36415 COLL VENOUS BLD VENIPUNCTURE: CPT | Performed by: UROLOGY

## 2025-03-17 DIAGNOSIS — R91.1 COIN LESION: ICD-10-CM

## 2025-03-17 DIAGNOSIS — Z01.89 ENCOUNTER FOR OTHER SPECIFIED SPECIAL EXAMINATIONS: Primary | ICD-10-CM

## 2025-03-24 ENCOUNTER — HOSPITAL ENCOUNTER (OUTPATIENT)
Dept: RADIOLOGY | Facility: HOSPITAL | Age: 75
Discharge: HOME | End: 2025-03-24
Payer: MEDICARE

## 2025-03-24 DIAGNOSIS — R91.1 COIN LESION: ICD-10-CM

## 2025-03-24 DIAGNOSIS — Z01.89 ENCOUNTER FOR OTHER SPECIFIED SPECIAL EXAMINATIONS: ICD-10-CM

## 2025-03-24 PROCEDURE — 71250 CT THORAX DX C-: CPT | Mod: TC,PO

## 2025-03-24 PROCEDURE — 71250 CT THORAX DX C-: CPT | Mod: 26,,, | Performed by: RADIOLOGY

## 2025-06-07 ENCOUNTER — HOSPITAL ENCOUNTER (EMERGENCY)
Facility: HOSPITAL | Age: 75
Discharge: HOME | End: 2025-06-08
Attending: EMERGENCY MEDICINE
Payer: MEDICARE

## 2025-06-07 DIAGNOSIS — R05.9 COUGH: ICD-10-CM

## 2025-06-07 DIAGNOSIS — J02.9 ACUTE PHARYNGITIS, UNSPECIFIED ETIOLOGY: Primary | ICD-10-CM

## 2025-06-07 PROCEDURE — 87651 STREP A DNA AMP PROBE: CPT

## 2025-06-07 PROCEDURE — 87502 INFLUENZA DNA AMP PROBE: CPT

## 2025-06-07 PROCEDURE — U0002 COVID-19 LAB TEST NON-CDC: HCPCS

## 2025-06-07 PROCEDURE — 99284 EMERGENCY DEPT VISIT MOD MDM: CPT

## 2025-06-08 VITALS
OXYGEN SATURATION: 98 % | WEIGHT: 183 LBS | HEIGHT: 72 IN | SYSTOLIC BLOOD PRESSURE: 108 MMHG | HEART RATE: 67 BPM | BODY MASS INDEX: 24.79 KG/M2 | TEMPERATURE: 99 F | RESPIRATION RATE: 17 BRPM | DIASTOLIC BLOOD PRESSURE: 57 MMHG

## 2025-06-08 LAB
ABSOLUTE EOSINOPHIL (SMH): 0.25 K/UL
ABSOLUTE MONOCYTE (SMH): 1.12 K/UL (ref 0.3–1)
ABSOLUTE NEUTROPHIL COUNT (SMH): 5.7 K/UL (ref 1.8–7.7)
ALBUMIN SERPL-MCNC: 4.1 G/DL (ref 3.5–5.2)
ALP SERPL-CCNC: 55 UNIT/L (ref 55–135)
ALT SERPL-CCNC: 11 UNIT/L (ref 10–44)
ANION GAP (SMH): 4 MMOL/L (ref 8–16)
AST SERPL-CCNC: 16 UNIT/L (ref 10–40)
BASOPHILS # BLD AUTO: 0.04 K/UL
BASOPHILS NFR BLD AUTO: 0.4 %
BILIRUB SERPL-MCNC: 0.5 MG/DL (ref 0.1–1)
BUN SERPL-MCNC: 6 MG/DL (ref 8–23)
CALCIUM SERPL-MCNC: 9.1 MG/DL (ref 8.7–10.5)
CHLORIDE SERPL-SCNC: 105 MMOL/L (ref 95–110)
CO2 SERPL-SCNC: 27 MMOL/L (ref 23–29)
CREAT SERPL-MCNC: 0.8 MG/DL (ref 0.5–1.4)
ERYTHROCYTE [DISTWIDTH] IN BLOOD BY AUTOMATED COUNT: 12.6 % (ref 11.5–14.5)
GFR SERPLBLD CREATININE-BSD FMLA CKD-EPI: >60 ML/MIN/1.73/M2
GLUCOSE SERPL-MCNC: 105 MG/DL (ref 70–110)
GROUP A STREP MOLECULAR (OHS): NEGATIVE
HCT VFR BLD AUTO: 42.2 % (ref 40–54)
HGB BLD-MCNC: 14.1 GM/DL (ref 14–18)
IMM GRANULOCYTES # BLD AUTO: 0.06 K/UL (ref 0–0.04)
IMM GRANULOCYTES NFR BLD AUTO: 0.6 % (ref 0–0.5)
INFLUENZA A MOLECULAR (OHS): NEGATIVE
INFLUENZA B MOLECULAR (OHS): NEGATIVE
LYMPHOCYTES # BLD AUTO: 2.25 K/UL (ref 1–4.8)
MCH RBC QN AUTO: 30.3 PG (ref 27–31)
MCHC RBC AUTO-ENTMCNC: 33.4 G/DL (ref 32–36)
MCV RBC AUTO: 91 FL (ref 82–98)
NUCLEATED RBC (/100WBC) (SMH): 0 /100 WBC
PLATELET # BLD AUTO: 375 K/UL (ref 150–450)
PMV BLD AUTO: 10.2 FL (ref 9.2–12.9)
POTASSIUM SERPL-SCNC: 3.7 MMOL/L (ref 3.5–5.1)
PROT SERPL-MCNC: 7.1 GM/DL (ref 6–8.4)
RBC # BLD AUTO: 4.66 M/UL (ref 4.6–6.2)
RELATIVE EOSINOPHIL (SMH): 2.7 % (ref 0–8)
RELATIVE LYMPHOCYTE (SMH): 23.9 % (ref 18–48)
RELATIVE MONOCYTE (SMH): 11.9 % (ref 4–15)
RELATIVE NEUTROPHIL (SMH): 60.5 % (ref 38–73)
SARS-COV-2 RDRP RESP QL NAA+PROBE: NEGATIVE
SODIUM SERPL-SCNC: 136 MMOL/L (ref 136–145)
WBC # BLD AUTO: 9.43 K/UL (ref 3.9–12.7)

## 2025-06-08 PROCEDURE — 82040 ASSAY OF SERUM ALBUMIN: CPT | Performed by: EMERGENCY MEDICINE

## 2025-06-08 PROCEDURE — 85025 COMPLETE CBC W/AUTO DIFF WBC: CPT | Performed by: EMERGENCY MEDICINE

## 2025-06-08 RX ORDER — AMOXICILLIN 875 MG/1
875 TABLET, COATED ORAL 2 TIMES DAILY
Qty: 14 TABLET | Refills: 0 | Status: SHIPPED | OUTPATIENT
Start: 2025-06-08

## 2025-06-08 RX ORDER — CETIRIZINE HYDROCHLORIDE 10 MG/1
10 TABLET ORAL DAILY PRN
Qty: 30 TABLET | Refills: 0 | Status: SHIPPED | OUTPATIENT
Start: 2025-06-08

## 2025-06-08 NOTE — ED PROVIDER NOTES
Encounter Date: 6/7/2025       History     Chief Complaint   Patient presents with    Sore Throat     X3 days     75-year-old male complaining of sore throat as well as a dry cough the last 3 days.  He denies any known fever.    The history is provided by the patient.     Review of patient's allergies indicates:  No Known Allergies  Past Medical History:   Diagnosis Date    COVID     DDD (degenerative disc disease), lumbosacral     GERD (gastroesophageal reflux disease)      Past Surgical History:   Procedure Laterality Date    SKIN BIOPSY       Family History   Problem Relation Name Age of Onset    Hypertension Mother       Social History[1]  Review of Systems   HENT:  Positive for sore throat.    Respiratory:  Positive for cough.    All other systems reviewed and are negative.      Physical Exam     Initial Vitals [06/07/25 2325]   BP Pulse Resp Temp SpO2   (!) 155/73 87 17 99.3 °F (37.4 °C) 97 %      MAP       --         Physical Exam    Nursing note and vitals reviewed.  Constitutional: He appears well-developed and well-nourished. He is not diaphoretic. No distress.   HENT:   Head: Normocephalic and atraumatic.   Posterior pharynx is erythematous.  No significant swelling or uvular deviation.   Eyes: Conjunctivae are normal.   Neck: Neck supple.   Normal range of motion.  Cardiovascular:  Normal rate.           Pulmonary/Chest: Breath sounds normal. No respiratory distress.   Abdominal: He exhibits no distension.   Musculoskeletal:         General: No edema.      Cervical back: Normal range of motion and neck supple.     Neurological: He is alert. He has normal strength.   Skin: Skin is warm and dry. No rash noted. No erythema.   Psychiatric: He has a normal mood and affect.         ED Course   Procedures  Labs Reviewed   COMPREHENSIVE METABOLIC PANEL - Abnormal       Result Value    Sodium 136      Potassium 3.7      Chloride 105      CO2 27      Glucose 105      BUN 6 (*)     Creatinine 0.8      Calcium 9.1       Protein Total 7.1      Albumin 4.1      Bilirubin Total 0.5      ALP 55      AST 16      ALT 11      Anion Gap 4 (*)     eGFR >60     CBC WITH DIFFERENTIAL - Abnormal    WBC 9.43      RBC 4.66      Hgb 14.1      Hct 42.2      MCV 91      MCH 30.3      MCHC 33.4      RDW 12.6      Platelet Count 375      MPV 10.2      Nucleated RBC 0      Neut % 60.5      Lymph % 23.9      Mono % 11.9      Eos % 2.7      Basophil % 0.4      Imm Grans % 0.6 (*)     Neut # 5.7      Lymph # 2.25      Mono # 1.12 (*)     Eos # 0.25      Baso # 0.04      Imm Grans # 0.06 (*)    INFLUENZA A & B BY MOLECULAR - Normal    INFLUENZA A MOLECULAR Negative      INFLUENZA B MOLECULAR  Negative     GROUP A STREP, MOLECULAR - Normal    Group A Strep Molecular Negative     SARS-COV-2 RNA AMPLIFICATION, QUAL - Normal    SARS COV-2 Molecular Negative     CBC W/ AUTO DIFFERENTIAL    Narrative:     The following orders were created for panel order CBC auto differential.  Procedure                               Abnormality         Status                     ---------                               -----------         ------                     CBC with Differential[[id number]]       Abnormal            Final result                 Please view results for these tests on the individual orders.          Imaging Results              X-Ray Chest PA And Lateral (Final result)  Result time 06/08/25 02:32:20      Final result by Harley Fuller MD (06/08/25 02:32:20)                   Impression:      Chronic and atelectatic change without radiographic evidence for superimposed acute process.      Electronically signed by: Harley Fuller  Date:    06/08/2025  Time:    02:32               Narrative:    EXAMINATION:  XR CHEST PA AND LATERAL    CLINICAL HISTORY:  Cough, unspecified    TECHNIQUE:  PA and lateral views of the chest were performed.    COMPARISON:  Chest radiograph July 17, 2024    FINDINGS:  Two views of the chest are submitted.  The  cardiomediastinal silhouette appears stable.  Appearance of the right hemidiaphragm may relate to mild eventration.  This appears stable.    Mild atelectatic change noted.  There is no evidence for confluent infiltrate or consolidation, significant pleural effusion or pneumothorax.    The osseous structures demonstrate chronic change including appearance of remote left rib fractures.                                       Medications - No data to display  Medical Decision Making  COVID, flu, strep swabs are negative.  CXR is clear.  I will write the patient for amoxicillin for possible bacterial pharyngitis as well as Zyrtec.  Discharged home with return precautions.    Amount and/or Complexity of Data Reviewed  Labs: ordered.  Radiology: ordered.    Risk  OTC drugs.  Prescription drug management.                                      Clinical Impression:  Final diagnoses:  [R05.9] Cough  [J02.9] Acute pharyngitis, unspecified etiology (Primary)          ED Disposition Condition    Discharge Stable          ED Prescriptions       Medication Sig Dispense Start Date End Date Auth. Provider    amoxicillin (AMOXIL) 875 MG tablet Take 1 tablet (875 mg total) by mouth 2 (two) times daily. 14 tablet 6/8/2025 -- Durga Jones MD    cetirizine (ZYRTEC) 10 MG tablet Take 1 tablet (10 mg total) by mouth daily as needed for Allergies. 30 tablet 6/8/2025 -- Durga Jones MD          Follow-up Information       Follow up With Specialties Details Why Contact Info    Uriel Clark, JOSUEP-C [facility]   [address] [place] [address]  [address] [place] [id number]  [phone]                     [1]   Social History  Tobacco Use    Smoking status: Former     Types: Cigarettes    Smokeless tobacco: Never   Substance Use Topics    Alcohol use: Yes    Drug use: Yes     Types: Marijuana        Durga Jones MD  06/08/25 3203

## 2025-08-20 ENCOUNTER — PATIENT MESSAGE (OUTPATIENT)
Dept: ADMINISTRATIVE | Facility: HOSPITAL | Age: 75
End: 2025-08-20
Payer: MEDICARE